# Patient Record
Sex: MALE | Race: WHITE | NOT HISPANIC OR LATINO | Employment: OTHER | URBAN - NONMETROPOLITAN AREA
[De-identification: names, ages, dates, MRNs, and addresses within clinical notes are randomized per-mention and may not be internally consistent; named-entity substitution may affect disease eponyms.]

---

## 2019-03-15 ENCOUNTER — OFFICE VISIT CONVERTED (OUTPATIENT)
Dept: FAMILY MEDICINE CLINIC | Age: 59
End: 2019-03-15
Attending: NURSE PRACTITIONER

## 2019-03-22 ENCOUNTER — HOSPITAL ENCOUNTER (OUTPATIENT)
Dept: OTHER | Facility: HOSPITAL | Age: 59
Discharge: HOME | End: 2019-03-22
Attending: NURSE PRACTITIONER

## 2019-03-22 LAB
ALBUMIN SERPL-MCNC: 4 G/DL (ref 3.5–5)
ALBUMIN/GLOB SERPL: 1.1 {RATIO} (ref 1.4–2.6)
ALP SERPL-CCNC: 88 U/L (ref 56–119)
ALT SERPL-CCNC: 22 U/L (ref 10–40)
ANION GAP SERPL CALC-SCNC: 19 MMOL/L (ref 8–19)
AST SERPL-CCNC: 19 U/L (ref 15–50)
BILIRUB SERPL-MCNC: 0.81 MG/DL (ref 0.2–1.3)
BUN SERPL-MCNC: 13 MG/DL (ref 5–25)
BUN/CREAT SERPL: 11 {RATIO} (ref 6–20)
CALCIUM SERPL-MCNC: 9.1 MG/DL (ref 8.7–10.4)
CHLORIDE SERPL-SCNC: 102 MMOL/L (ref 99–111)
CHOLEST SERPL-MCNC: 148 MG/DL (ref 107–200)
CHOLEST/HDLC SERPL: 3.7 {RATIO} (ref 3–6)
CONV CO2: 25 MMOL/L (ref 22–32)
CONV TOTAL PROTEIN: 7.8 G/DL (ref 6.3–8.2)
CREAT UR-MCNC: 1.18 MG/DL (ref 0.7–1.2)
EST. AVERAGE GLUCOSE BLD GHB EST-MCNC: 146 MG/DL
GFR SERPLBLD BASED ON 1.73 SQ M-ARVRAT: >60 ML/MIN/{1.73_M2}
GLOBULIN UR ELPH-MCNC: 3.8 G/DL (ref 2–3.5)
GLUCOSE SERPL-MCNC: 117 MG/DL (ref 70–99)
HBA1C MFR BLD: 6.7 % (ref 3.5–5.7)
HDLC SERPL-MCNC: 40 MG/DL (ref 40–60)
LDLC SERPL CALC-MCNC: 73 MG/DL (ref 70–100)
OSMOLALITY SERPL CALC.SUM OF ELEC: 295 MOSM/KG (ref 273–304)
POTASSIUM SERPL-SCNC: 4.4 MMOL/L (ref 3.5–5.3)
PSA SERPL-MCNC: 0.53 NG/ML (ref 0–4)
SODIUM SERPL-SCNC: 142 MMOL/L (ref 135–147)
TRIGL SERPL-MCNC: 174 MG/DL (ref 40–150)
VLDLC SERPL-MCNC: 35 MG/DL (ref 5–37)

## 2019-04-01 ENCOUNTER — OFFICE VISIT CONVERTED (OUTPATIENT)
Dept: FAMILY MEDICINE CLINIC | Age: 59
End: 2019-04-01
Attending: NURSE PRACTITIONER

## 2019-05-16 ENCOUNTER — HOSPITAL ENCOUNTER (OUTPATIENT)
Dept: DIABETES SERVICES | Facility: HOSPITAL | Age: 59
Setting detail: RECURRING SERIES
Discharge: HOME | End: 2019-05-17
Attending: NURSE PRACTITIONER

## 2019-08-16 ENCOUNTER — OFFICE VISIT CONVERTED (OUTPATIENT)
Dept: FAMILY MEDICINE CLINIC | Age: 59
End: 2019-08-16
Attending: NURSE PRACTITIONER

## 2019-11-08 ENCOUNTER — OFFICE VISIT (OUTPATIENT)
Dept: ORTHOPEDIC SURGERY | Facility: CLINIC | Age: 59
End: 2019-11-08

## 2019-11-08 ENCOUNTER — HOSPITAL ENCOUNTER (OUTPATIENT)
Dept: OTHER | Facility: HOSPITAL | Age: 59
Discharge: HOME | End: 2019-11-08
Attending: NURSE PRACTITIONER

## 2019-11-08 DIAGNOSIS — M25.561 RIGHT KNEE PAIN, UNSPECIFIED CHRONICITY: Primary | ICD-10-CM

## 2019-11-08 LAB
ALBUMIN SERPL-MCNC: 4.3 G/DL (ref 3.5–5)
ALBUMIN/GLOB SERPL: 1.3 {RATIO} (ref 1.4–2.6)
ALP SERPL-CCNC: 96 U/L (ref 56–119)
ALT SERPL-CCNC: 20 U/L (ref 10–40)
ANION GAP SERPL CALC-SCNC: 17 MMOL/L (ref 8–19)
AST SERPL-CCNC: 23 U/L (ref 15–50)
BILIRUB SERPL-MCNC: 0.38 MG/DL (ref 0.2–1.3)
BUN SERPL-MCNC: 14 MG/DL (ref 5–25)
BUN/CREAT SERPL: 12 {RATIO} (ref 6–20)
CALCIUM SERPL-MCNC: 9.4 MG/DL (ref 8.7–10.4)
CHLORIDE SERPL-SCNC: 103 MMOL/L (ref 99–111)
CHOLEST SERPL-MCNC: 135 MG/DL (ref 107–200)
CHOLEST/HDLC SERPL: 3.9 {RATIO} (ref 3–6)
CONV CO2: 24 MMOL/L (ref 22–32)
CONV CREATININE URINE, RANDOM: 117.1 MG/DL (ref 10–300)
CONV MICROALBUM.,U,RANDOM: <12 MG/L (ref 0–20)
CONV TOTAL PROTEIN: 7.5 G/DL (ref 6.3–8.2)
CREAT UR-MCNC: 1.19 MG/DL (ref 0.7–1.2)
EST. AVERAGE GLUCOSE BLD GHB EST-MCNC: 137 MG/DL
GFR SERPLBLD BASED ON 1.73 SQ M-ARVRAT: >60 ML/MIN/{1.73_M2}
GLOBULIN UR ELPH-MCNC: 3.2 G/DL (ref 2–3.5)
GLUCOSE SERPL-MCNC: 111 MG/DL (ref 70–99)
HBA1C MFR BLD: 6.4 % (ref 3.5–5.7)
HDLC SERPL-MCNC: 35 MG/DL (ref 40–60)
LDLC SERPL CALC-MCNC: 75 MG/DL (ref 70–100)
MICROALBUMIN/CREAT UR: 10.2 MG/G{CRE} (ref 0–25)
OSMOLALITY SERPL CALC.SUM OF ELEC: 291 MOSM/KG (ref 273–304)
POTASSIUM SERPL-SCNC: 4.3 MMOL/L (ref 3.5–5.3)
SODIUM SERPL-SCNC: 140 MMOL/L (ref 135–147)
TRIGL SERPL-MCNC: 127 MG/DL (ref 40–150)
VLDLC SERPL-MCNC: 25 MG/DL (ref 5–37)

## 2019-11-08 PROCEDURE — 99203 OFFICE O/P NEW LOW 30 MIN: CPT | Performed by: ORTHOPAEDIC SURGERY

## 2019-11-08 PROCEDURE — 73560 X-RAY EXAM OF KNEE 1 OR 2: CPT | Performed by: ORTHOPAEDIC SURGERY

## 2019-11-08 RX ORDER — PRAVASTATIN SODIUM 10 MG
10 TABLET ORAL DAILY
Refills: 1 | COMMUNITY
Start: 2019-08-16 | End: 2021-08-26 | Stop reason: SDUPTHER

## 2019-11-10 NOTE — PROGRESS NOTES
NEW VISIT    Patient: Haja Browning  ?  YOB: 1960    MRN: [id number]  ?  Chief Complaint   Patient presents with   • Right Knee - Establish Care      ?  HPI: The patient is here for a second opinion on his knee.  He has previously been seen by Dr. Gama Murphy and underwent a knee arthroscopy in 2005 for meniscus pathology.  He has done well after the knee arthroscopy but here lately his symptoms have started to recur.  He has been very active as a  in high school sports.  He denies any recent history of trauma.  He states that the pain is on the medial aspect of the knee.  There is a progressive varus deformity of the knee.  He has intermittent moderate pain which is worse with activity.  He has a constant dull ache on the medial side of the knee which radiates to the proximal tibia.  There is no sense of clicking or catching of the knee.  He does have symptoms in the retropatellar region when he goes up and down the steps.  He has been taking anti-inflammatory medication and using an elastic sleeve which does help support the need to some degree.  He states that he has recently lost 21 pounds and that seems to have helped to minimize his symptoms.  He is not looking for surgical options at this point because he can tolerate his symptoms fairly well with nonoperative means.  I have discussed with him that eventually he is going to need knee replacement surgery down the road.    Pain Location: right knee(s)  Radiation: none  Quality: dull, aching  Intensity/Severity: moderate  Duration: 2 year(s)  Onset quality: gradual   Timing: intermittent  Aggravating Factors: kneeling, rising after sitting, squatting and standing for prolonged time  Alleviating Factors: OTC analgesics, NSAID's and stretching  Previous Episodes: yes  Associated Symptoms: pain, swelling, clicking/popping, decreased strength  ADL Affected: ambulating  Previous Treatment: The patient has had a knee arthroscopy in 2005.    This  patient is a new patient.  This problem is new to this examiner.      Allergies: No Known Allergies    Medications:   Home Medications:  Current Outpatient Medications on File Prior to Visit   Medication Sig   • metFORMIN (GLUCOPHAGE) 500 MG tablet Take 500 mg by mouth 2 (Two) Times a Day.   • pravastatin (PRAVACHOL) 10 MG tablet Take 10 mg by mouth Daily.     No current facility-administered medications on file prior to visit.      Current Medications:  Scheduled Meds:  PRN Meds:.    I have reviewed the patient's medical history in detail and updated the computerized patient record.  Review and summarization of old records include:    No past medical history on file.  Past Surgical History:   Procedure Laterality Date   • KNEE SURGERY Right 01/25/2005    knee arthroscopy     Social History     Occupational History   • Not on file   Tobacco Use   • Smoking status: Not on file   Substance and Sexual Activity   • Alcohol use: Not on file   • Drug use: Not on file   • Sexual activity: Not on file      Social History     Social History Narrative   • Not on file     No family history on file.      Review of Systems  Constitutional: Negative for appetite change.   HENT: Negative.    Eyes: Negative.    Respiratory: Negative.    Cardiovascular: Negative.    Gastrointestinal: Negative.    Endocrine: Negative.    Genitourinary: Negative.    Musculoskeletal: See details of exam below.  Skin: Negative.    Allergic/Immunologic: Negative.    Hematological: Negative.    Psychiatric/Behavioral: Negative.         Wt Readings from Last 3 Encounters:   No data found for Wt     Ht Readings from Last 3 Encounters:   No data found for Ht     There is no height or weight on file to calculate BMI.  No height and weight on file for this encounter.  There were no vitals filed for this visit.      Physical Exam  Constitutional: Patient is oriented to person, place, and time. Appears well-developed and well-nourished.   HENT:   Head:  Normocephalic and atraumatic.   Eyes: Conjunctivae and EOM are normal. Pupils are equal, round, and reactive to light.   Cardiovascular: Normal rate, regular rhythm, normal heart sounds and intact distal pulses.   Pulmonary/Chest: Effort normal and breath sounds normal.   Musculoskeletal:   See detailed exam below   Neurological: Alert and oriented to person, place, and time. No sensory deficit. Coordination normal.   Skin: Skin is warm and dry. Capillary refill takes less than 2 seconds. No rash noted. No erythema.   Psychiatric: Patient has a normal mood and affect. His behavior is normal. Judgment and thought content normal.   Nursing note and vitals reviewed.      Ortho Exam:   Right knee (varus). Patient has crepitus throughout range of motion. Positive patellar grind test. Mild effusion. Lachman is negative. Pivot shift is negative. Anterior and posterior drawer signs are negative. Significant joint line tenderness is noted on the medial aspect of the knee. Patient has a varus orientation of the knee. There is fullness and tenderness in the Popliteal fossa. Mild distention of a Popliteal cyst is noted in this location. Range of motion in flexion is from 0-120 degrees. Neurovascular status is intact.  Dorsalis pedis and posterior tibial artery pulses are palpable. Common peroneal nerve function is well preserved. Patient's gait is cautious and antalgic. Skin and soft tissues are mildly swollen, consistent with synovitis and effusion. The patient has a significant limp with the first few steps after starting the gait cycle. Getting out of a chair takes a lot of effort due to pain on knee flexion.       Diagnostics:  xrays obtained today   right Knee X-Ray  Indication: Evaluation of pain and discomfort on the medial aspect of the knee.  AP, Lateral views  Findings: Moderately advanced osteoarthritis with narrowing of the medial joint space and varus alignment of the knee.  no bony lesion  Soft tissues within  normal limits  decreased joint spaces  Hardware appropriately positioned not applicable      no prior studies available for comparison.    This patient's x-ray report was graded according to the Kellgren and Daniel classification.  This took into account the joint space narrowing, osteophyte formation, sclerosis of the distal femur/proximal tibia along with deformity of those bones.  The findings were indicative of K L grade 3.    X-RAY was ordered and reviewed by Raleigh Pinon MD    Assessment:  Haja was seen today for establish care.    Diagnoses and all orders for this visit:    Right knee pain, unspecified chronicity  -     XR Knee 1 or 2 View Right  -     Diclofenac Sodium (PENNSAID) 2 % solution; Apply two pumps to affected area bid          Procedures  ?    Plan    · Compression/brace to protect the knee from buckling and giving her.  · Intra-articular injection of steroid and Visco supplementation discussed with the patient at length.  · Glucosamine, chondroitin and turmeric for cartilage health discussed with the patient.  · Calcium and vitamin D for bone health.  · At this point I do not think that he is a candidate for surgical intervention.  If his pain continues to bother him we might consider getting an MRI to make sure he does not have a meniscus tear that might be amenable to arthroscopic surgery.  The patient does not have any dominant signs of meniscus pathology on physical examination today.  · Rest, ice, compression, and elevation (RICE) therapy  · Stretching and strengthening exercises of the quads and the hamstrings.  · OTC Tylenol 500-1000mg by mouth every 6 hours as needed for pain   · Topical application of Pennsaid to act as an anti-inflammatory agent.  · Continue with the weight loss program including increasing exercise protocols, reduced portion sizes, carbohydrate limited intake, reduced calories consumed and consultation with a dietary specialist such as a registered  dietitian.  · Follow up in 1 year(s)    Date of encounter: 11/08/2019   Ralegih Pinon MD

## 2020-02-14 ENCOUNTER — OFFICE VISIT CONVERTED (OUTPATIENT)
Dept: FAMILY MEDICINE CLINIC | Age: 60
End: 2020-02-14
Attending: NURSE PRACTITIONER

## 2020-02-17 ENCOUNTER — HOSPITAL ENCOUNTER (OUTPATIENT)
Dept: OTHER | Facility: HOSPITAL | Age: 60
Discharge: HOME | End: 2020-02-17
Attending: NURSE PRACTITIONER

## 2020-02-17 LAB
ALBUMIN SERPL-MCNC: 3.9 G/DL (ref 3.5–5)
ALBUMIN/GLOB SERPL: 1.1 {RATIO} (ref 1.4–2.6)
ALP SERPL-CCNC: 74 U/L (ref 56–119)
ALT SERPL-CCNC: 17 U/L (ref 10–40)
ANION GAP SERPL CALC-SCNC: 16 MMOL/L (ref 8–19)
AST SERPL-CCNC: 20 U/L (ref 15–50)
BILIRUB SERPL-MCNC: 0.62 MG/DL (ref 0.2–1.3)
BUN SERPL-MCNC: 10 MG/DL (ref 5–25)
BUN/CREAT SERPL: 9 {RATIO} (ref 6–20)
CALCIUM SERPL-MCNC: 9.3 MG/DL (ref 8.7–10.4)
CHLORIDE SERPL-SCNC: 102 MMOL/L (ref 99–111)
CHOLEST SERPL-MCNC: 136 MG/DL (ref 107–200)
CHOLEST/HDLC SERPL: 3.7 {RATIO} (ref 3–6)
CONV CO2: 25 MMOL/L (ref 22–32)
CONV TOTAL PROTEIN: 7.3 G/DL (ref 6.3–8.2)
CREAT UR-MCNC: 1.07 MG/DL (ref 0.7–1.2)
EST. AVERAGE GLUCOSE BLD GHB EST-MCNC: 148 MG/DL
GFR SERPLBLD BASED ON 1.73 SQ M-ARVRAT: >60 ML/MIN/{1.73_M2}
GLOBULIN UR ELPH-MCNC: 3.4 G/DL (ref 2–3.5)
GLUCOSE SERPL-MCNC: 110 MG/DL (ref 70–99)
HBA1C MFR BLD: 6.8 % (ref 3.5–5.7)
HDLC SERPL-MCNC: 37 MG/DL (ref 40–60)
LDLC SERPL CALC-MCNC: 75 MG/DL (ref 70–100)
OSMOLALITY SERPL CALC.SUM OF ELEC: 288 MOSM/KG (ref 273–304)
POTASSIUM SERPL-SCNC: 4.2 MMOL/L (ref 3.5–5.3)
SODIUM SERPL-SCNC: 139 MMOL/L (ref 135–147)
TRIGL SERPL-MCNC: 122 MG/DL (ref 40–150)
VLDLC SERPL-MCNC: 24 MG/DL (ref 5–37)

## 2020-07-28 DIAGNOSIS — M25.561 RIGHT KNEE PAIN, UNSPECIFIED CHRONICITY: ICD-10-CM

## 2020-08-19 ENCOUNTER — OFFICE VISIT CONVERTED (OUTPATIENT)
Dept: FAMILY MEDICINE CLINIC | Age: 60
End: 2020-08-19
Attending: NURSE PRACTITIONER

## 2020-08-24 ENCOUNTER — HOSPITAL ENCOUNTER (OUTPATIENT)
Dept: OTHER | Facility: HOSPITAL | Age: 60
Discharge: HOME | End: 2020-08-24
Attending: NURSE PRACTITIONER

## 2020-08-24 LAB
ALBUMIN SERPL-MCNC: 4.4 G/DL (ref 3.5–5)
ALBUMIN/GLOB SERPL: 1.5 {RATIO} (ref 1.4–2.6)
ALP SERPL-CCNC: 78 U/L (ref 56–119)
ALT SERPL-CCNC: 20 U/L (ref 10–40)
ANION GAP SERPL CALC-SCNC: 17 MMOL/L (ref 8–19)
AST SERPL-CCNC: 23 U/L (ref 15–50)
BASOPHILS # BLD MANUAL: 0.05 10*3/UL (ref 0–0.2)
BASOPHILS NFR BLD MANUAL: 0.6 % (ref 0–3)
BILIRUB SERPL-MCNC: 0.69 MG/DL (ref 0.2–1.3)
BUN SERPL-MCNC: 15 MG/DL (ref 5–25)
BUN/CREAT SERPL: 12 {RATIO} (ref 6–20)
CALCIUM SERPL-MCNC: 10 MG/DL (ref 8.7–10.4)
CHLORIDE SERPL-SCNC: 103 MMOL/L (ref 99–111)
CHOLEST SERPL-MCNC: 153 MG/DL (ref 107–200)
CHOLEST/HDLC SERPL: 4.4 {RATIO} (ref 3–6)
CONV CO2: 21 MMOL/L (ref 22–32)
CONV TOTAL PROTEIN: 7.4 G/DL (ref 6.3–8.2)
CREAT UR-MCNC: 1.3 MG/DL (ref 0.7–1.2)
DEPRECATED RDW RBC AUTO: 42.2 FL
EOSINOPHIL # BLD MANUAL: 0.37 10*3/UL (ref 0–0.7)
EOSINOPHIL NFR BLD MANUAL: 4.2 % (ref 0–7)
ERYTHROCYTE [DISTWIDTH] IN BLOOD BY AUTOMATED COUNT: 12.8 % (ref 11.5–14.5)
EST. AVERAGE GLUCOSE BLD GHB EST-MCNC: 137 MG/DL
GFR SERPLBLD BASED ON 1.73 SQ M-ARVRAT: 60 ML/MIN/{1.73_M2}
GLOBULIN UR ELPH-MCNC: 3 G/DL (ref 2–3.5)
GLUCOSE SERPL-MCNC: 114 MG/DL (ref 70–99)
GRANS (ABSOLUTE): 6.2 10*3/UL (ref 2–8)
GRANS: 70.1 % (ref 30–85)
HBA1C MFR BLD: 15.7 G/DL (ref 14–18)
HBA1C MFR BLD: 6.4 % (ref 3.5–5.7)
HCT VFR BLD AUTO: 46.4 % (ref 42–52)
HDLC SERPL-MCNC: 35 MG/DL (ref 40–60)
IMM GRANULOCYTES # BLD: 0.05 10*3/UL (ref 0–0.54)
IMM GRANULOCYTES NFR BLD: 0.6 % (ref 0–0.43)
LDLC SERPL CALC-MCNC: 85 MG/DL (ref 70–100)
LYMPHOCYTES # BLD MANUAL: 1.59 10*3/UL (ref 1–5)
LYMPHOCYTES NFR BLD MANUAL: 6.5 % (ref 3–10)
MCH RBC QN AUTO: 30.4 PG (ref 27–31)
MCHC RBC AUTO-ENTMCNC: 33.8 G/DL (ref 33–37)
MCV RBC AUTO: 89.9 FL (ref 80–96)
MONOCYTES # BLD AUTO: 0.57 10*3/UL (ref 0.2–1.2)
OSMOLALITY SERPL CALC.SUM OF ELEC: 286 MOSM/KG (ref 273–304)
PLATELET # BLD AUTO: 218 10*3/UL (ref 130–400)
PMV BLD AUTO: 10.1 FL (ref 7.4–10.4)
POTASSIUM SERPL-SCNC: 4.2 MMOL/L (ref 3.5–5.3)
PSA SERPL-MCNC: 0.61 NG/ML (ref 0–4)
RBC # BLD AUTO: 5.16 10*6/UL (ref 4.7–6.1)
SODIUM SERPL-SCNC: 137 MMOL/L (ref 135–147)
TRIGL SERPL-MCNC: 167 MG/DL (ref 40–150)
TSH SERPL-ACNC: 1.66 M[IU]/L (ref 0.27–4.2)
VARIANT LYMPHS NFR BLD MANUAL: 18 % (ref 20–45)
VLDLC SERPL-MCNC: 33 MG/DL (ref 5–37)
WBC # BLD AUTO: 8.83 10*3/UL (ref 4.8–10.8)

## 2020-10-16 DIAGNOSIS — M25.561 RIGHT KNEE PAIN, UNSPECIFIED CHRONICITY: Primary | ICD-10-CM

## 2020-10-19 ENCOUNTER — HOSPITAL ENCOUNTER (OUTPATIENT)
Dept: OTHER | Facility: HOSPITAL | Age: 60
Discharge: HOME | End: 2020-10-19
Attending: PHYSICIAN ASSISTANT

## 2020-10-19 ENCOUNTER — OFFICE VISIT (OUTPATIENT)
Dept: ORTHOPEDIC SURGERY | Facility: CLINIC | Age: 60
End: 2020-10-19

## 2020-10-19 VITALS — HEIGHT: 73 IN | WEIGHT: 250 LBS | BODY MASS INDEX: 33.13 KG/M2 | TEMPERATURE: 97.9 F

## 2020-10-19 DIAGNOSIS — M17.11 PRIMARY OSTEOARTHRITIS OF RIGHT KNEE: Primary | ICD-10-CM

## 2020-10-19 PROCEDURE — 99213 OFFICE O/P EST LOW 20 MIN: CPT | Performed by: PHYSICIAN ASSISTANT

## 2020-10-19 RX ORDER — MELOXICAM 7.5 MG/1
TABLET ORAL
Qty: 30 TABLET | Refills: 3 | Status: SHIPPED | OUTPATIENT
Start: 2020-10-19 | End: 2021-02-16 | Stop reason: SDUPTHER

## 2020-10-19 NOTE — PROGRESS NOTES
FOLLOW UP VISIT    Patient: Nolan Browning  ?  YOB: 1960    MRN: [id number]  ?  Chief Complaint   Patient presents with   • Right Knee - Pain      ?  HPI:   Nolan Browning is a 60 y.o. male who presents with follow up on right knee pain.  He was last seen in November 2019 and states that over the last several months the knee has increased.  He recently retired for a second time, last week, and can already tell a big difference in his pain level.  He reports increased pain with increased standing and walking.    Pain Location:  RIGHT knee  Radiation: into the posterior aspect of the knee  Quality: aching  Intensity/Severity: moderate  Duration: 5 years  Progression of symptoms: yes, progressive worsening although right now symptoms are improved  Onset quality: gradual   Timing: intermittent  Aggravating Factors: walking, squatting, standing  Alleviating Factors: NSAIDs, rest  Previous Episodes: yes  Associated Symptoms: pain, swelling, clicking/popping  ADLs Affected: work related activities, recreational activities/sports, ambulating  Previous Treatment: NSAIDs      This patient is an established patient.  This problem is new to this examiner.      Allergies: No Known Allergies    Medications:   Home Medications:  Current Outpatient Medications on File Prior to Visit   Medication Sig   • Diclofenac Sodium (Pennsaid) 2 % solution Apply two pumps to the affected area twice a day prn pain and discomfort   • metFORMIN (GLUCOPHAGE) 500 MG tablet Take 500 mg by mouth 2 (Two) Times a Day.   • pravastatin (PRAVACHOL) 10 MG tablet Take 10 mg by mouth Daily.     No current facility-administered medications on file prior to visit.      Current Medications:  Scheduled Meds:  PRN Meds:.    I have reviewed the patient's medical history in detail and updated the computerized patient record.  Review and summarization of old records include:    Past Medical History:   Diagnosis Date   • Diabetes (CMS/[facility])    •  "High cholesterol      Past Surgical History:   Procedure Laterality Date   • KNEE SURGERY Right 01/25/2005    knee arthroscopy    • WISDOM TOOTH EXTRACTION       Social History     Occupational History   • Not on file   Tobacco Use   • Smoking status: Never Smoker   • Smokeless tobacco: Never Used   Substance and Sexual Activity   • Alcohol use: Yes   • Drug use: Never   • Sexual activity: Defer     Family History   Problem Relation Age of Onset   • Heart disease Mother    • Dementia Father    • Stroke Father          Review of Systems  Constitutional: Negative.  Negative for fever.   Eyes: Negative.    Respiratory: Negative.    Cardiovascular: Negative.    Endocrine: Negative.    Musculoskeletal: Positive for arthralgias, gait problem and joint swelling.   Skin: Negative.  Negative for rash and wound.   Allergic/Immunologic: Negative.    Neurological: Negative for numbness.   Hematological: Negative.    Psychiatric/Behavioral: Negative.         Wt Readings from Last 3 Encounters:   10/19/20 113 kg (250 lb)     Ht Readings from Last 3 Encounters:   10/19/20 185.4 cm (73\")     Body mass index is 32.98 kg/m².  Facility age limit for growth percentiles is 20 years.  Vitals:    10/19/20 1123   Temp: 97.9 °F (36.6 °C)         Physical Exam  Constitutional: Patient is oriented to person, place, and time. Appears well-developed and well-nourished.   HENT:   Head: Normocephalic and atraumatic.   Eyes: Conjunctivae and EOM are normal. Pupils are equal, round, and reactive to light.   Cardiovascular: Normal rate and intact distal pulses.   Pulmonary/Chest: Effort normal.   Musculoskeletal:   See detailed exam below   Neurological: Alert and oriented to person, place, and time. No sensory deficit. Coordination normal.   Skin: Skin is warm and dry. Capillary refill takes less than 2 seconds. No rash noted. No erythema.   Psychiatric: Patient has a normal mood and affect. His behavior is normal. Judgment and thought " content normal.   Nursing note and vitals reviewed.      Ortho Exam:   RIGHT knee:  There is mild joint line tenderness at the medial and posterior aspect of the knee. The knee has a slight varus orientation.   Positive for crepitus throughout range of motion.   Negative for effusion.  Positive patellar grind test.   Negative Lachman test.    Negative anterior and posterior drawer.  Range of motion in extension and flexion is: 0-90 degrees.  Neurovascular status is intact.  Dorsalis pedis and posterior tibial artery pulses are palpable. Common peroneal nerve function is well preserved.   Gait is cautious and antalgic.        Diagnostics:  Right knee xrays 2 views were ordered by Andres Marie PA-C and performed at [facility] on 10/19/20. These images were independently viewed and interpreted by myself, my impression as follows:  Findings: Moderate tricompartmental osteoarthritis with near bone-on-bone at the medial joint space.  Bony lesion: no  Soft tissues: within normal limits  Joint spaces: decreased  Hardware appropriately positioned: not applicable  Prior studies available for comparison: yes                Assessment:  Diagnoses and all orders for this visit:    1. Primary osteoarthritis of right knee (Primary)  -     meloxicam (MOBIC) 7.5 MG tablet; 1 Oral Daily with food.  Dispense: 30 tablet; Refill: 3            Plan       · Management of a stable chronic illness/condition   · Discussion of orthopaedic goals and activities.  · Risk, benefits, and merits of treatment alternatives reviewed with the patient.  Discussed treatment options to include oral anti-inflammatories, intra-articular steroid injections or viscosupplementation, and eventual total knee replacement.  At this point since his pain has improved since making the appointment we are going to try oral Mobic once daily.  If his pain were to increase and he wants to try an injection he will call our office.  · Ice,  heat, and/or modalities as beneficial  · Patient is encouraged to call or return for any issues or concerns.  · Continue with knee sleeve  · Follow up in 6 months      Andres Marie PA-C  Date of encounter: 10/19/2020     Electronically signed by Andres Marie PA-C, 10/19/20, 11:25 AM EDT.    EMR Dragon/Transcription disclaimer:  Much of this encounter note is an electronic transcription/translation of spoken language to printed text. The electronic translation of spoken language may permit erroneous, or at times, nonsensical words or phrases to be inadvertently transcribed; Although I have reviewed the note for such errors, some may still exist.

## 2020-10-21 DIAGNOSIS — S89.91XA KNEE INJURY, RIGHT, INITIAL ENCOUNTER: Primary | ICD-10-CM

## 2020-10-22 ENCOUNTER — HOSPITAL ENCOUNTER (OUTPATIENT)
Dept: OTHER | Facility: HOSPITAL | Age: 60
Discharge: HOME | End: 2020-10-22
Attending: ORTHOPAEDIC SURGERY

## 2020-10-22 ENCOUNTER — OFFICE VISIT (OUTPATIENT)
Dept: ORTHOPEDIC SURGERY | Facility: CLINIC | Age: 60
End: 2020-10-22

## 2020-10-22 VITALS — TEMPERATURE: 97.4 F | WEIGHT: 255 LBS | BODY MASS INDEX: 33.8 KG/M2 | HEIGHT: 73 IN

## 2020-10-22 DIAGNOSIS — M25.461 EFFUSION OF RIGHT KNEE: ICD-10-CM

## 2020-10-22 DIAGNOSIS — M17.11 PRIMARY OSTEOARTHRITIS OF RIGHT KNEE: Primary | ICD-10-CM

## 2020-10-22 PROCEDURE — 99213 OFFICE O/P EST LOW 20 MIN: CPT | Performed by: ORTHOPAEDIC SURGERY

## 2020-10-22 PROCEDURE — 20610 DRAIN/INJ JOINT/BURSA W/O US: CPT | Performed by: ORTHOPAEDIC SURGERY

## 2020-10-22 RX ADMIN — METHYLPREDNISOLONE ACETATE 160 MG: 80 INJECTION, SUSPENSION INTRA-ARTICULAR; INTRALESIONAL; INTRAMUSCULAR; SOFT TISSUE at 15:38

## 2020-10-22 RX ADMIN — LIDOCAINE HYDROCHLORIDE 2 ML: 10 INJECTION, SOLUTION EPIDURAL; INFILTRATION; INTRACAUDAL; PERINEURAL at 15:38

## 2020-10-22 NOTE — PROGRESS NOTES
Orthopedic office visit.    Patient: Nolan Browning    YOB: 1960    MRN: [id number]    Chief Complaints: right knee injury while playing golf on 10/21/2020    History of Present Illness: Patient returns today for right knee pain.  His pain is located over the medial and lateral aspect of the joint.  The pain has been progressive in nature and remains intermittent .  His pain is worsened by going up and down stairs, kneeling, rising after sitting. There has been improvement in the past with injections.  He states that he fell on the golf links and since that time has been limping quite significantly.  I am concerned that he might have a meniscus tear or a ligament injury.  We have also discussed the possibility of a nondisplaced fracture of the metaphysis of the bone.  He states that he has been limping quite significantly since the time of his injury.  He has had previous injuries to the knee especially on the right side several years ago.    This problem is not new to this examiner.     Allergies: No Known Allergies    Medications:   Home Medications:  Current Outpatient Medications on File Prior to Visit   Medication Sig   • Diclofenac Sodium (Pennsaid) 2 % solution Apply two pumps to the affected area twice a day prn pain and discomfort   • meloxicam (MOBIC) 7.5 MG tablet 1 Oral Daily with food.   • metFORMIN (GLUCOPHAGE) 500 MG tablet Take 500 mg by mouth 2 (Two) Times a Day.   • pravastatin (PRAVACHOL) 10 MG tablet Take 10 mg by mouth Daily.     No current facility-administered medications on file prior to visit.      Current Medications:  Scheduled Meds:  PRN Meds:.    I have reviewed the patient's medical history in detail and updated the computerized patient record.  Review and summarization of old records include:    Past Medical History:   Diagnosis Date   • Diabetes (CMS/[facility])    • High cholesterol      Past Surgical History:   Procedure Laterality Date   • KNEE SURGERY Right 01/25/2005  "   knee arthroscopy    • WISDOM TOOTH EXTRACTION       Social History     Occupational History   • Not on file   Tobacco Use   • Smoking status: Never Smoker   • Smokeless tobacco: Never Used   Substance and Sexual Activity   • Alcohol use: Yes   • Drug use: Never   • Sexual activity: Defer      Social History     Social History Narrative   • Not on file     Family History   Problem Relation Age of Onset   • Heart disease Mother    • Dementia Father    • Stroke Father        Review of Systems  Constitutional: Negative for appetite change.   HENT: Negative.    Eyes: Negative.    Respiratory: Negative.    Cardiovascular: Negative.    Gastrointestinal: Negative.    Endocrine: Negative.    Genitourinary: Negative.    Musculoskeletal: See details of exam below.  Skin: Negative.    Allergic/Immunologic: Negative.    Hematological: Negative.    Psychiatric/Behavioral: Negative.         Wt Readings from Last 3 Encounters:   10/22/20 116 kg (255 lb)   10/19/20 113 kg (250 lb)     Ht Readings from Last 3 Encounters:   10/22/20 185.4 cm (73\")   10/19/20 185.4 cm (73\")     Body mass index is 33.64 kg/m².  Facility age limit for growth percentiles is 20 years.  Vitals:    10/22/20 1518   Temp: 97.4 °F (36.3 °C)       Physical Exam  Constitutional: Patient is oriented to person, place, and time. Appears well-developed and well-nourished.   HENT:   Head: Normocephalic and atraumatic.   Eyes: Conjunctivae and EOM are normal. Pupils are equal, round, and reactive to light.   Cardiovascular: Normal rate, regular rhythm, normal heart sounds and intact distal pulses.   Pulmonary/Chest: Effort normal and breath sounds normal.   Musculoskeletal:   See detailed exam below   Neurological: Alert and oriented to person, place, and time. No sensory deficit. Coordination normal.   Skin: Skin is warm and dry. Capillary refill takes less than 2 seconds. No rash noted. No erythema.   Psychiatric: Patient has a normal mood and affect. His " behavior is normal. Judgment and thought content normal.   Nursing note and vitals reviewed.    Musculoskeletal:    Right knee (varus). Patient has crepitus throughout range of motion. Positive patellar grind test. Mild effusion. Lachman is negative. Pivot shift is negative. Anterior and posterior drawer signs are negative. Significant joint line tenderness is noted on the medial aspect of the knee. Patient has a varus orientation of the knee. There is fullness and tenderness in the Popliteal fossa. Mild distention of a Popliteal cyst is noted in this location. Range of motion in flexion is from 0-120 degrees. Neurovascular status is intact.  Dorsalis pedis and posterior tibial artery pulses are palpable. Common peroneal nerve function is well preserved. Patient's gait is cautious and antalgic. Skin and soft tissues are mildly swollen, consistent with synovitis and effusion. The patient has a significant limp with the first few steps after starting the gait cycle. Getting out of a chair takes a lot of effort due to pain on knee flexion.       Diagnostics:      Procedure:  Large Joint Arthrocentesis: R knee  Date/Time: 10/22/2020 3:38 PM  Consent given by: patient  Site marked: site marked  Timeout: Immediately prior to procedure a time out was called to verify the correct patient, procedure, equipment, support staff and site/side marked as required   Supporting Documentation  Indications: pain   Procedure Details  Location: knee - R knee  Preparation: Patient was prepped and draped in the usual sterile fashion  Needle size: 25 G  Approach: anteromedial  Medications administered: 2 mL lidocaine PF 1% 1 %; 160 mg methylPREDNISolone acetate 80 MG/ML  Aspirate amount: 20 mL  Aspirate: bloody  Patient tolerance: patient tolerated the procedure well with no immediate complications          Assessment:   Diagnoses and all orders for this visit:    1. Primary osteoarthritis of right knee (Primary)          Plan:    · Injected patient's right knee joint(s)with steroid from an anteromedial approach.  · 20 cc of altered blood were withdrawn from the joint as a part of the arthrocentesis procedure.  · Compression/brace to the knee to prevent buckling and giving out.  · Discussed with the patient about getting an MRI for continued symptoms.  · Rest, ice, compression, and elevation (RICE) therapy.  · Calcium and vitamin D for bone health.  · Discussed with the patient that if the MRI shows a tear of his meniscus or cartilage or ligament he might be a candidate for an surgical intervention.  If he does not demonstrate any signs of an injury sustained during this outing at golf then we could treat him nonoperatively.  · OTC Alternate Ibuprofen and Tylenol as needed  · Follow up in 6 week(s)    Date of encounter: 10/22/2020   Raleigh Pinon MD

## 2020-10-25 PROBLEM — M25.461 EFFUSION OF RIGHT KNEE: Status: ACTIVE | Noted: 2020-10-25

## 2020-10-25 RX ORDER — METHYLPREDNISOLONE ACETATE 80 MG/ML
160 INJECTION, SUSPENSION INTRA-ARTICULAR; INTRALESIONAL; INTRAMUSCULAR; SOFT TISSUE
Status: COMPLETED | OUTPATIENT
Start: 2020-10-22 | End: 2020-10-22

## 2020-10-25 RX ORDER — LIDOCAINE HYDROCHLORIDE 10 MG/ML
2 INJECTION, SOLUTION EPIDURAL; INFILTRATION; INTRACAUDAL; PERINEURAL
Status: COMPLETED | OUTPATIENT
Start: 2020-10-22 | End: 2020-10-22

## 2021-02-16 DIAGNOSIS — M17.11 PRIMARY OSTEOARTHRITIS OF RIGHT KNEE: ICD-10-CM

## 2021-02-16 RX ORDER — MELOXICAM 7.5 MG/1
TABLET ORAL
Qty: 30 TABLET | Refills: 3 | Status: SHIPPED | OUTPATIENT
Start: 2021-02-16 | End: 2021-06-28 | Stop reason: SDUPTHER

## 2021-02-19 ENCOUNTER — OFFICE VISIT CONVERTED (OUTPATIENT)
Dept: FAMILY MEDICINE CLINIC | Age: 61
End: 2021-02-19
Attending: NURSE PRACTITIONER

## 2021-02-22 ENCOUNTER — HOSPITAL ENCOUNTER (OUTPATIENT)
Dept: OTHER | Facility: HOSPITAL | Age: 61
Discharge: HOME | End: 2021-02-22
Attending: NURSE PRACTITIONER

## 2021-02-22 LAB
ALBUMIN SERPL-MCNC: 4.2 G/DL (ref 3.5–5)
ALBUMIN/GLOB SERPL: 1.4 {RATIO} (ref 1.4–2.6)
ALP SERPL-CCNC: 87 U/L (ref 56–119)
ALT SERPL-CCNC: 21 U/L (ref 10–40)
ANION GAP SERPL CALC-SCNC: 14 MMOL/L (ref 8–19)
AST SERPL-CCNC: 19 U/L (ref 15–50)
BILIRUB SERPL-MCNC: 0.95 MG/DL (ref 0.2–1.3)
BUN SERPL-MCNC: 10 MG/DL (ref 5–25)
BUN/CREAT SERPL: 9 {RATIO} (ref 6–20)
CALCIUM SERPL-MCNC: 9.1 MG/DL (ref 8.7–10.4)
CHLORIDE SERPL-SCNC: 105 MMOL/L (ref 99–111)
CHOLEST SERPL-MCNC: 142 MG/DL (ref 107–200)
CHOLEST/HDLC SERPL: 3.6 {RATIO} (ref 3–6)
CONV CO2: 27 MMOL/L (ref 22–32)
CONV CREATININE URINE, RANDOM: 121.2 MG/DL (ref 10–300)
CONV MICROALBUM.,U,RANDOM: <12 MG/L (ref 0–20)
CONV TOTAL PROTEIN: 7.3 G/DL (ref 6.3–8.2)
CREAT UR-MCNC: 1.14 MG/DL (ref 0.7–1.2)
EST. AVERAGE GLUCOSE BLD GHB EST-MCNC: 140 MG/DL
GFR SERPLBLD BASED ON 1.73 SQ M-ARVRAT: >60 ML/MIN/{1.73_M2}
GLOBULIN UR ELPH-MCNC: 3.1 G/DL (ref 2–3.5)
GLUCOSE SERPL-MCNC: 109 MG/DL (ref 70–99)
HBA1C MFR BLD: 6.5 % (ref 3.5–5.7)
HDLC SERPL-MCNC: 39 MG/DL (ref 40–60)
LDLC SERPL CALC-MCNC: 75 MG/DL (ref 70–100)
MICROALBUMIN/CREAT UR: 9.9 MG/G{CRE} (ref 0–25)
OSMOLALITY SERPL CALC.SUM OF ELEC: 294 MOSM/KG (ref 273–304)
POTASSIUM SERPL-SCNC: 4.4 MMOL/L (ref 3.5–5.3)
SODIUM SERPL-SCNC: 142 MMOL/L (ref 135–147)
TRIGL SERPL-MCNC: 141 MG/DL (ref 40–150)
VLDLC SERPL-MCNC: 28 MG/DL (ref 5–37)

## 2021-04-28 DIAGNOSIS — M17.11 PRIMARY OSTEOARTHRITIS OF RIGHT KNEE: Primary | ICD-10-CM

## 2021-04-29 ENCOUNTER — OFFICE VISIT (OUTPATIENT)
Dept: ORTHOPEDIC SURGERY | Facility: CLINIC | Age: 61
End: 2021-04-29

## 2021-04-29 VITALS — HEIGHT: 73 IN | BODY MASS INDEX: 33.8 KG/M2 | TEMPERATURE: 96.6 F | WEIGHT: 255 LBS

## 2021-04-29 DIAGNOSIS — M17.11 PRIMARY OSTEOARTHRITIS OF RIGHT KNEE: Primary | ICD-10-CM

## 2021-04-29 DIAGNOSIS — M25.461 EFFUSION OF RIGHT KNEE: ICD-10-CM

## 2021-04-29 PROCEDURE — 99213 OFFICE O/P EST LOW 20 MIN: CPT | Performed by: ORTHOPAEDIC SURGERY

## 2021-05-18 NOTE — PROGRESS NOTES
Nolan Browning  1960     Office/Outpatient Visit    Visit Date: Fri, Feb 14, 2020 08:34 am    Provider: Greta Lawrence N.P. (Assistant: Kathy Ramirez MA)    Location: [facility]        Electronically signed by Greta Lawrence N.P. on  02/14/2020 09:54:08 AM                             Subjective:        CC: Haja is a 59 year old White male.  Patient presents today for six month follow up (not taking multivitamin)         HPI: Patient to be evaluated for type 2 diabetes.  Current meds include an oral hypoglycemic ( Glucophage ) and a statin (Pravastatin).  He reports home blood glucose readings have been excellent, with average fasting glucoses running <120 mg/dL.  Most recent lab results include Hemoglobin A1c:  6.4 (%) (11/2019).  In regard to preventative care, Has not yet had diabetic eye exam..  Met with dietician but did not find this helpful and therefore did not meet with diabetes educator.    ROS:     CONSTITUTIONAL:  Negative for chills and fever.      EYES:  Negative for blurred vision and eye drainage.      E/N/T:  Negative for ear pain and sore throat.      CARDIOVASCULAR:  Negative for chest pain and palpitations.      RESPIRATORY:  Negative for dyspnea and frequent wheezing.      NEUROLOGICAL:  Negative for dizziness and headaches.      PSYCHIATRIC:  Negative for depression and suicidal thoughts.          Past Medical History / Family History / Social History:         Last Reviewed on 2/14/2020 09:52 AM by Greta Lawrence    Past Medical History:             PAST MEDICAL HISTORY         Gout         CURRENT MEDICAL PROVIDERS:    Orthopedist: Dr Pinon         PREVENTIVE HEALTH MAINTENANCE             COLORECTAL CANCER SCREENING: Up to date (colonoscopy q10y; sigmoidoscopy q5y; Cologuard q3y) was last done 7/17/19, Results are in chart; colonoscopy with the following abnormalities noted-- Polyp(s) and Diverticulosis; The next colonoscopy is due  2022     PSA: was last done 3/2/19  with normal results         Surgical History:         right knee for meniscus tear 2005;         Family History:         Mother: Cause of death was MI     Brother(s): Diabetes         Social History:     Occupation: Retired (Prior occupation: teacher, )     Marital Status:      Children: 1 child         Tobacco/Alcohol/Supplements:     Last Reviewed on 2/14/2020 08:39 AM by Kathy Ramirez    Tobacco: He has never smoked.          Alcohol: Frequency: Socially When he drinks, the average quantity of alcohol is 1 drinks.   He typically consumes beer.          Substance Abuse History:     None         Mental Health History:     NEGATIVE         Communicable Diseases (eg STDs):     Reportable health conditions; NEGATIVE         Current Problems:     Gout, unspecified    Encounter for screening for other disorder    Type 2 diabetes mellitus without complications    Diverticulosis of large intestine without perforation or abscess without bleeding    Personal history of colonic polyps    Encounter for immunization    Encounter for screening for depression        Immunizations:     None        Allergies:     Last Reviewed on 2/14/2020 08:38 AM by Kathy Ramirez    No Known Allergies.        Current Medications:     Last Reviewed on 2/14/2020 08:39 AM by Kathy Ramirez    Osteo Bi-Flex po qd     Tumeric po qd     Metformin HCl 500mg Tablet [1 tab bid]        Objective:        Vitals:         Historical:     8/16/2019  BP:   113/84 mm Hg ( (left arm, , sitting, );) 8/16/2019  P:   90bpm ( (left arm (BP Cuff), , sitting, );) 8/16/2019  Wt:   256.8lbs    Current: 2/14/2020 8:40:02 AM    Ht:  6 ft, 1 in;  Wt: 260.6 lbs;  BMI: 34.4T: 98.5 F (oral);  BP: 117/75 mm Hg (left arm, sitting);  P: 96 bpm (left arm (BP Cuff), sitting);  sCr: 1.19 mg/dL;  GFR: 79.07        Exams:     PHYSICAL EXAM:     GENERAL: well developed, well nourished;  no apparent distress;     RESPIRATORY: normal respiratory rate and pattern with no  distress; normal breath sounds with no rales, rhonchi, wheezes or rubs;     CARDIOVASCULAR: normal rate; rhythm is regular;     NEUROLOGIC: mental status: alert and oriented x 3; GROSSLY INTACT     PSYCHIATRIC: appropriate affect and demeanor; normal thought and perception;     Left foot exam    Protective sensation using Monofilament test: NORMAL sensation. Patient detects .07 grams of force which is considered normal.    Vascular status: normal peripheral vascular exam with palpable dorsal pedal and posterior tibal pulses and brisk digital capillary refill    Skin is intact without sores or ulcers    Right foot exam    Protective sensation using Monofilament test: NORMAL sensation. Patient detects .07 grams of force which is considered normal.    Vascular status: normal peripheral vascular exam with palpable dorsal pedal and posterior tibal pulses and brisk digital capillary refill    Skin is intact without sores or ulcers         Assessment:         E11.9   Type 2 diabetes mellitus without complications       Z23   Encounter for immunization       Z13.31   Encounter for screening for depression           ORDERS:         Meds Prescribed:       [Refilled] metFORMIN 500 mg oral tablet [1 tab bid], #180 (one hundred and eighty) tablets, Refills: 1 (one)       [New Rx] pravastatin 10 mg oral tablet [take 1 tablet (10 mg) by oral route once daily], #90 (ninety) tablets, Refills: 1 (one)       [New Rx] lisinopriL 5 mg oral tablet [take 1 tablet (5 mg) by oral route once daily], #90 (ninety) tablets, Refills: 1 (one)         Radiology/Test Orders:       3017F  Colorectal CA screen results documented and reviewed (PV)  (In-House)              Lab Orders:       10786  DIAB - [facility] LIPID,CMP, A1C: 01365, 99818, 86887  (Send-Out)              Procedures Ordered:       92040  Immunization administration; one vaccine  (In-House)              Other Orders:       41669  Influenza virus vaccine, quadrivalent, split virus,  preservative free 3 years of age & older  (In-House)            DLEYE  Dilated Eye Exam  (Send-Out)              Depression screen negative  (In-House)            2028F  Foot examination performed (includes examination through visual inspection, sensory exam with monofilament, and pulse exam - report when any of the three components are completed) (DM)4  (In-House)                      Plan:         Type 2 diabetes mellitus without complicationsWill add Ace inhibitor for renal protection. Due for eye exam. Plan to schedule.        RECOMMENDATIONS: instructed in use of glucometer ( check glucose daily at random intervals ), adherance to Low carb, NCS diet diet,  HgbA1C every 6 months, urine microalbumin test yearly, annual monofilament test for evaluating sensation in feet, daily foot self-inspection, yearly dental exams, annual eye exams, and need for yearly flu shots.      FOLLOW-UP: Schedule a follow-up visit in 6 months.  LABORATORY:  Labs ordered to be performed today include Diabetes Panel 1; CMP, Lipid, A1C.  MIPS Vaccines Flu and Pneumonia updated in Shot record Colorectal Cancer Screening is up to date and the results are in the chart           Prescriptions:       [Refilled] metFORMIN 500 mg oral tablet [1 tab bid], #180 (one hundred and eighty) tablets, Refills: 1 (one)       [New Rx] pravastatin 10 mg oral tablet [take 1 tablet (10 mg) by oral route once daily], #90 (ninety) tablets, Refills: 1 (one)       [New Rx] lisinopriL 5 mg oral tablet [take 1 tablet (5 mg) by oral route once daily], #90 (ninety) tablets, Refills: 1 (one)           Orders:       DLEYE  Dilated Eye Exam  (Send-Out)            [address]  [address] LIPID,CMP, A1C: 07440, 92120, 51603  (Send-Out)            3017F  Colorectal CA screen results documented and reviewed (PV)  (In-House)              Encounter for immunization          Immunizations:       46662  Immunization administration; one vaccine  (In-House)            91514   "Influenza virus vaccine, quadrivalent, split virus, preservative free 3 years of age & older  (In-House)                Dose (ml): 0.5  Site: left deltoid  Route: intramuscular  Administered by: Kathy Ramirez          : [employer]  Lot #: [id number]  Exp: 06/30/2020          NDC: [address]-77        Encounter for screening for depression    MIPS PHQ-9 Depression Screening: Completed form scanned and in chart; Total Score 0; Negative Depression Screen           Orders:         Depression screen negative  (In-House)                  Other Orders      2028F  Foot examination performed (includes examination through visual inspection, sensory exam with monofilament, and pulse exam - report when any of the three components are completed) (DM)4  (In-House)              Patient Recommendations:        For  Type 2 diabetes mellitus without complications:    Obtain instruction in the proper use of a home blood glucose monitor. Follow a diabetic diet as directed. Have blood checked regularly for long term glucose control (a test called \"hemoglobin A1C\"). In your case, the hemoglobin A1C should be checked at least every 6 months. Have your urine regularly tested to check for protein, which is an early sign of diabetic kidney problems. Get this urine protein test done every year. Have an annual monofilament test to check for diabetes-related sensory nerve disturbance in the feet. Examine your feet daily.  Small cuts and injuries often go unnoticed and can lead to serious infections. Have an annual dental exam. Have an annual eye exam. Obtain a flu shot each year.  Schedule a follow-up visit in 6 months.              Charge Capture:         Primary Diagnosis:     E11.9  Type 2 diabetes mellitus without complications           Orders:      26048  Office/outpatient visit; established patient, level 4  (In-House)            3017F  Colorectal CA screen results documented and reviewed (PV)  (In-House)              " Z23  Encounter for immunization           Orders:      59040  Immunization administration; one vaccine  (In-House)            46228  Influenza virus vaccine, quadrivalent, split virus, preservative free 3 years of age & older  (In-House)              Z13.31  Encounter for screening for depression           Orders:        Depression screen negative  (In-House)                  Other Orders:       2028F  Foot examination performed (includes examination through visual inspection, sensory exam with monofilament, and pulse exam - report when any of the three components are completed) (DM)4  (In-House)

## 2021-05-18 NOTE — PROGRESS NOTES
[place], Nolan CHUNGMarshall 1960     Office/Outpatient Visit    Visit Date: Fri, Aug 16, 2019 09:52 am    Provider: Greta Lawrence N.P. (Assistant: Kathy Ramirez MA)    Location: [facility]        Electronically signed by Greta Lawrence N.P. on  08/16/2019 10:47:11 AM                             SUBJECTIVE:        CC:     Haja is a 59 year old White male.  Patient presents today for medication refills         HPI:         Patient to be evaluated for type 2 diabetes.  Current meds include an oral hypoglycemic ( Glucophage ).  He reports home blood glucose readings have been excellent, with average fasting glucoses running <120 mg/dL.  Most recent lab results include Estimated Avg Glucose:  146 (mg/dL) (03/22/2019), Hemoglobin A1c:  6.7 (%) (03/22/2019).  In regard to preventative care, Has not yet had diabetic eye exam..  Met with dietician but did not find this helpful and therefore did not meet with diabetes educator.      ROS:     CONSTITUTIONAL:  Negative for chills and fever.      CARDIOVASCULAR:  Negative for chest pain and palpitations.      RESPIRATORY:  Negative for dyspnea and frequent wheezing.      GASTROINTESTINAL:  Negative for abdominal pain and vomiting.      NEUROLOGICAL:  Negative for dizziness and headaches.      PSYCHIATRIC:  Negative for depression and suicidal thoughts.          PMH/FMH/SH:     Last Reviewed on 8/16/2019 10:14 AM by Greta Lawrence    Past Medical History:             PAST MEDICAL HISTORY         Gout         PREVENTIVE HEALTH MAINTENANCE             COLORECTAL CANCER SCREENING: Up to date (colonoscopy q10y; sigmoidoscopy q5y; Cologuard q3y) was last done 7/17/19, Results are in chart; colonoscopy with the following abnormalities noted-- Polyp(s) and Diverticulosis; The next colonoscopy is due  2022     PSA: was last done 3/2/19 with normal results         Surgical History:         right knee for meniscus tear 2005;         Family History:         Mother: Cause of death  was MI     Brother(s): Diabetes         Social History:     Occupation: Retired (Prior occupation: teacher, )     Marital Status:      Children: 1 child         Tobacco/Alcohol/Supplements:     Last Reviewed on 8/16/2019 09:56 AM by Kathy Ramirez    Tobacco: He has never smoked.          Alcohol: Frequency: Socially When he drinks, the average quantity of alcohol is 1 drinks.   He typically consumes beer.          Substance Abuse History:     None         Mental Health History:     NEGATIVE         Communicable Diseases (eg STDs):     Reportable health conditions; NEGATIVE             Current Problems:     Type 2 diabetes     Gout, unspecified     Screening for colon cancer     Screening for depression         Immunizations:     None        Allergies:     Last Reviewed on 8/16/2019 09:55 AM by Kathy Ramirez      No Known Drug Allergies.         Current Medications:     Last Reviewed on 8/16/2019 09:55 AM by Kathy Ramirez    Lancet   Lancet 1-2 times daily w/ one touch verio Dx E11.9     Multivitamin po qd     Osteo Bi-Flex po qd     Tumeric po qd     Metformin HCl 500mg Tablet 1 tab bid         OBJECTIVE:        Vitals:         Historical:     04/01/2019  BP:   127/88 mm Hg ( (left arm, , sitting, );)     04/01/2019  P:   106bpm ( (left arm (BP Cuff), , sitting, );)     04/01/2019  Wt:   271.2lbs        Current: 8/16/2019 9:57:07 AM    Ht:  6 ft, 1 in;  Wt: 256.8 lbs;  BMI: 33.9    T: 97.9 F (oral);  BP: 113/84 mm Hg (left arm, sitting);  P: 90 bpm (left arm (BP Cuff), sitting);  sCr: 1.18 mg/dL;  GFR: 79.24        Exams:     PHYSICAL EXAM:     GENERAL: well developed, well nourished;  no apparent distress;     RESPIRATORY: normal respiratory rate and pattern with no distress; normal breath sounds with no rales, rhonchi, wheezes or rubs;     CARDIOVASCULAR: normal rate; rhythm is regular;     MUSCULOSKELETAL: normal gait;     NEUROLOGIC: mental status: alert and oriented x 3; GROSSLY INTACT      PSYCHIATRIC: appropriate affect and demeanor;     Left foot exam    Protective sensation using Monofilament test: NORMAL sensation. Patient detects .07 grams of force which is considered normal.    Vascular status: normal peripheral vascular exam with palpable dorsal pedal and posterior tibal pulses and brisk digital capillary refill    Skin is intact without sores or ulcers    Right foot exam    Protective sensation using Monofilament test: NORMAL sensation. Patient detects .07 grams of force which is considered normal.    Vascular status: normal peripheral vascular exam with palpable dorsal pedal and posterior tibal pulses and brisk digital capillary refill    Skin is intact without sores or ulcers         ASSESSMENT           250.00   E11.9  Type 2 diabetes              DDx:     V12.72   Z86.010  History of colonic polyps              DDx:     562.10   K57.30  Diverticulosis              DDx:         ORDERS:         Meds Prescribed:       Refill of: Metformin HCl 500mg Tablet 1 tab bid  #180 (One [place] [address]) tablet(s) Refills: 1       Pravastatin 10mg Tablet 1 tab daily  #90 (Ninety) tablet(s) Refills: 1         Radiology/Test Orders:       3017F  Colorectal CA screen results documented and reviewed (PV)  (In-House)           Lab Orders:       APPTO  Appointment need  (In-House)         86186  DIAB2 - [facility] CMP A1C LIPID AND MICRO ALBUM CR RATIO: 98179,71539,03198,71027,73703  (Send-Out)           Other Orders:       2028F  Foot examination performed (includes examination through visual inspection, sensory exam with monofi  (In-House)         DLEYE  Dilated Eye Exam  (Send-Out)                   PLAN:          Type 2 diabetes Will start statin with hx diabetes to reduce risk for CV events.     LABORATORY:  Labs ordered to be performed today include Diabetes Panel 2;CMP, A1C, Lipid, Microalbumin:Creatinine Ratio.      FOLLOW-UP: Schedule a follow-up visit in 6 months.      RECOMMENDATIONS: instructed in use  "of glucometer ( check glucose daily at random intervals ), adherance to Low carb, NCS diet diet,  HgbA1C every 6 months, urine microalbumin test yearly, annual monofilament test for evaluating sensation in feet, daily foot self-inspection, yearly dental exams, annual eye exams, and need for yearly flu shots.            Prescriptions:       Refill of: Metformin HCl 500mg Tablet 1 tab bid  #180 (One [place] [address]) tablet(s) Refills: 1       Pravastatin 10mg Tablet 1 tab daily  #90 (Ninety) tablet(s) Refills: 1           Orders:       APPTO  Appointment need  (In-House)         46719  DIAB2 - [facility] CMP A1C LIPID AND MICRO ALBUM CR RATIO: 04951,60118,88146,85792,45554  (Send-Out)         DLEYE  Dilated Eye Exam  (Send-Out)            History of colonic polyps To repeat colonoscopy in 3 years.     MIPS Vaccines Flu and Pneumonia updated in Shot record Colorectal Cancer Screening is up to date and the results are in the chart           Orders:       3017F  Colorectal CA screen results documented and reviewed (PV)  (In-House)            Diverticulosis As above             Other Orders:       2028F  Foot examination performed (includes examination through visual inspection, sensory exam with monofi  (In-House)           Patient Recommendations:        For  Type 2 diabetes:     Schedule a follow-up visit in 6 months.  Obtain instruction in the proper use of a home blood glucose monitor. Follow a diabetic diet as directed. Have blood checked regularly for long term glucose control (a test called \"hemoglobin A1C\"). In your case, the hemoglobin A1C should be checked at least every 6 months. Have your urine regularly tested to check for protein, which is an early sign of diabetic kidney problems. Get this urine protein test done every year. Have an annual monofilament test to check for diabetes-related sensory nerve disturbance in the feet. Examine your feet daily.  Small cuts and injuries often go unnoticed and can lead to " serious infections. Have an annual dental exam. Have an annual eye exam. Obtain a flu shot each year.              CHARGE CAPTURE           **Please note: ICD descriptions below are intended for billing purposes only and may not represent clinical diagnoses**        Primary Diagnosis:         250.00 Type 2 diabetes            E11.9    Type 2 diabetes mellitus without complications              Orders:          67086   Office/outpatient visit; established patient, level 4 (28 minutes)  (In-House)             APPTO   Appointment need  (In-House)           V12.72 History of colonic polyps            Z86.010    Personal history of colonic polyps              Orders:          3017F   Colorectal CA screen results documented and reviewed (PV)  (In-House)           562.10 Diverticulosis            K57.30    Diverticulosis of large intestine without perforation or abscess without bleeding        Other Orders:           2028F   Foot examination performed (includes examination through visual inspection, sensory exam with monofi  (In-House)

## 2021-05-18 NOTE — PROGRESS NOTES
Nolan Browning 1960     Office/Outpatient Visit    Visit Date: Mon, Apr 1, 2019 09:26 am    Provider: Greta Lawrence N.P. (Assistant: Sandra Travis MA)    Location: [facility]        Electronically signed by Greta Lawrence N.P. on  04/01/2019 11:21:36 AM                             SUBJECTIVE:        CC:     Haja is a 59 year old White male.  This is a follow-up visit.  labs         HPI:     Haja is here today to follow up on his lab work that he had done last month. PSA normal. Kidney function, liver function, electrolytes normal. Triglycerides with some elevation. Fasting blood sugar elevated at 117. HgbA1C elevated at 6.7 indicating diabetes. No prior history of diabetes. Currently on no medications.     ROS:     CONSTITUTIONAL:  Negative for chills and fever.      EYES:  Negative for blurred vision and eye drainage.      CARDIOVASCULAR:  Negative for chest pain and palpitations.      RESPIRATORY:  Negative for dyspnea and frequent wheezing.      NEUROLOGICAL:  Negative for dizziness and headaches.      PSYCHIATRIC:  Negative for depression and suicidal thoughts.          PMH/FMH/SH:     Last Reviewed on 3/15/2019 03:01 PM by Greta Lawrence    Past Medical History:             PAST MEDICAL HISTORY         Gout         PREVENTIVE HEALTH MAINTENANCE             COLORECTAL CANCER SCREENING: declines colorectal cancer screening, understands reason for testing     PSA: was last done 3/2/19 with normal results         Surgical History:         right knee for meniscus tear 2005;         Family History:         Mother: Cause of death was MI     Brother(s): Diabetes         Social History:     Occupation: Retired (Prior occupation: teacher, )     Marital Status:      Children: 1 child         Tobacco/Alcohol/Supplements:     Last Reviewed on 4/01/2019 09:29 AM by Sandra Travis    Tobacco: He has never smoked.          Alcohol: Frequency: Socially When he drinks, the average  quantity of alcohol is 1 drinks.   He typically consumes beer.          Substance Abuse History:     None         Mental Health History:     NEGATIVE         Communicable Diseases (eg STDs):     Reportable health conditions; NEGATIVE             Current Problems:     Type 2 diabetes     Gout, unspecified     Diastasis recti     Screening for prostate cancer     Screening for cardiovascular conditions     Screening for colon cancer     Screening for depression         Immunizations:     None        Allergies:     Last Reviewed on 4/01/2019 09:29 AM by Sandra Travis      No Known Drug Allergies.         Current Medications:     Last Reviewed on 4/01/2019 09:29 AM by Sandra Travis    Multivitamin po qd     Osteo Bi-Flex po qd     Tumeric po qd         OBJECTIVE:        Vitals:         Historical:     03/15/2019  BP:   128/82 mm Hg ( (left arm, , sitting, );)     03/15/2019  Wt:   275.4lbs        Current: 4/1/2019 9:30:43 AM    Ht:  6 ft, 1 in;  Wt: 271.2 lbs;  BMI: 35.8    T: 97 F (oral);  BP: 127/88 mm Hg (left arm, sitting);  P: 106 bpm (left arm (BP Cuff), sitting);  sCr: 1.18 mg/dL;  GFR: 81.10        Exams:     PHYSICAL EXAM:     GENERAL: well developed, well nourished;  no apparent distress;     RESPIRATORY: normal respiratory rate and pattern with no distress; normal breath sounds with no rales, rhonchi, wheezes or rubs;     CARDIOVASCULAR: normal rate; rhythm is regular;     NEUROLOGIC: mental status: alert and oriented x 3; GROSSLY INTACT     PSYCHIATRIC: appropriate affect and demeanor;         ASSESSMENT           250.00   E11.9  Type 2 diabetes              DDx:         ORDERS:         Meds Prescribed:       Glucose Reagent Blood Test Strips (Glucose Reagent Blood Test Strips) Reagent Strips Check blood sugar 1-2 times per day E11.9  #100 (One [place]) strip(s) Refills: 0       Metformin HCl 500mg Tablet 1 tab bid  #60 (Sixty) tablet(s) Refills: 2       Lancet  Lancet 1-2 times daily w/ one touch  verio Dx E11.9  #100 (One [place]) lancet Refills: 5       Blood Glucose Meter Kit blood glucose meter E11.9 use as directed, substitute according to Insurance plan  #1 (One) kit(s) Refills: 0         Lab Orders:       APPTO  Appointment need  (In-House)           Procedures Ordered:       REFER  Referral to Specialist or Other Facility  (Send-Out)           Other Orders:       DLEYE  Dilated Eye Exam  (Send-Out)                   PLAN: Has appointment with general surgeon for colon CA screening.          Type 2 diabetes Educational handouts provided to patient.         REFERRALS:  Referral initiated to Diabetic Educator and Dietitian.      FOLLOW-UP: Schedule a follow-up visit in 3 months.      RECOMMENDATIONS: instructed in use of glucometer ( check glucose daily at random intervals ), adherance to Low carb, NCS diet diet, a graduated exercise program, daily foot self-inspection, yearly dental exams, and annual eye exams.            Prescriptions:       Glucose Reagent Blood Test Strips (Glucose Reagent Blood Test Strips) Reagent Strips Check blood sugar 1-2 times per day E11.9  #100 (One [place]) strip(s) Refills: 0       Metformin HCl 500mg Tablet 1 tab bid  #60 (Sixty) tablet(s) Refills: 2       Lancet  Lancet 1-2 times daily w/ one touch verio Dx E11.9  #100 (One [place]) lancet Refills: 5       Blood Glucose Meter Kit blood glucose meter E11.9 use as directed, substitute according to Insurance plan  #1 (One) kit(s) Refills: 0           Orders:       REFER  Referral to Specialist or Other Facility  (Send-Out)         DLEYE  Dilated Eye Exam  (Send-Out)         APPTO  Appointment need  (In-House)             Patient Education Handouts:       Diabetes (Foot and Skin Problems)        Diabetes - Create Your Own Plate - [facility]              Patient Recommendations:        For  Type 2 diabetes:     Obtain instruction in the proper use of a home blood glucose monitor. Follow a diabetic diet as directed. Maintain an  exercise regimen as directed. Examine your feet daily.  Small cuts and injuries often go unnoticed and can lead to serious infections. Have an annual dental exam. Have an annual eye exam.  Schedule a follow-up visit in 3 months.              CHARGE CAPTURE           **Please note: ICD descriptions below are intended for billing purposes only and may not represent clinical diagnoses**        Primary Diagnosis:         250.00 Type 2 diabetes            E11.9    Type 2 diabetes mellitus without complications              Orders:          46650   Office/outpatient visit; established patient, level 3 (33 minutes)  (In-House)             APPTO   Appointment need  (In-House)

## 2021-05-18 NOTE — PROGRESS NOTES
Nolan Browning 1960     Office/Outpatient Visit    Visit Date: Fri, Mar 15, 2019 02:37 pm    Provider: Greta Lawrence N.P. (Assistant: Kathy Ramirez MA)    Location: [facility]        Electronically signed by Greta Lawrence N.P. on  03/15/2019 03:48:07 PM                             SUBJECTIVE:        CC:     Mr. Browning is a 59 year old White male.  This is his first visit to the clinic.  Patient presents today for new patient establishment, also has complaints of gout         HPI:         PHQ-9 Depression Screening: Completed form scanned and in chart; Total Score 1 Alcohol Consumption Screening: Completed form scanned and in chart; Total Score 1     Haja presents for gout. Dr. Erickson first diagnosed gout many years ago. This flare up started about one week ago. Left great toe and now traveling up to ankle. Has had several flare ups for which he took Ibuprofen and a prescription medication unsure of name in the past.     Also reports abdominal pouching. Especially when going from lying to sitting position. Has had for about 5 years. Denies pain but wanted to get it checked out.          ROS:     CONSTITUTIONAL:  Negative for chills and fever.      EYES:  Negative for blurred vision and eye drainage.      E/N/T:  Negative for ear pain and sore throat.      CARDIOVASCULAR:  Negative for chest pain and palpitations.      RESPIRATORY:  Negative for dyspnea and frequent wheezing.      GASTROINTESTINAL:  Positive for abdominal pouching.   Negative for abdominal pain or vomiting.      MUSCULOSKELETAL:  Negative for joint stiffness and myalgias.      INTEGUMENTARY:  Negative for rash.      NEUROLOGICAL:  Negative for dizziness and headaches.      PSYCHIATRIC:  Negative for depression and suicidal thoughts.          PMH/FMH/SH:     Last Reviewed on 3/15/2019 03:01 PM by Greta Lawrence    Past Medical History:             PAST MEDICAL HISTORY         Gout         PREVENTIVE HEALTH MAINTENANCE              COLORECTAL CANCER SCREENING: declines colorectal cancer screening, understands reason for testing     PSA: has never been done         Surgical History:         right knee for meniscus tear 2005;         Family History:         Mother: Cause of death was MI         Social History:     Occupation: Retired (Prior occupation: teacher, )     Marital Status:      Children: 1 child         Tobacco/Alcohol/Supplements:     Last Reviewed on 3/15/2019 02:41 PM by Kathy Ramirez    Tobacco: He has never smoked.          Alcohol: Frequency: Socially When he drinks, the average quantity of alcohol is 1 drinks.   He typically consumes beer.          Substance Abuse History:     None         Mental Health History:     NEGATIVE         Communicable Diseases (eg STDs):     Reportable health conditions; NEGATIVE             Current Problems:     Gout, unspecified     Screening for prostate cancer     Screening for cardiovascular conditions     Hernia, NOS     Screening for colon cancer     Screening for depression         Immunizations:     None        Allergies:     Last Reviewed on 3/15/2019 02:40 PM by Kathy Ramirez      No Known Drug Allergies.         Current Medications:     Last Reviewed on 3/15/2019 02:41 PM by Kathy Ramirez    Multivitamin po qd     Osteo Bi-Flex po qd     Tumeric po qd         OBJECTIVE:        Vitals:         Current: 3/15/2019 2:42:52 PM    Ht:  6 ft, 1 in;  Wt: 275.4 lbs;  BMI: 36.3    T: 98.5 F (oral);  BP: 128/82 mm Hg (left arm, sitting);  P: 108 bpm (left arm (BP Cuff), sitting)        Exams:     PHYSICAL EXAM:     GENERAL: well developed, well nourished;  no apparent distress;     EYES: PERRL, EOMI     NECK: range of motion is normal; trachea is midline;     RESPIRATORY: normal respiratory rate and pattern with no distress; normal breath sounds with no rales, rhonchi, wheezes or rubs;     CARDIOVASCULAR: normal rate; rhythm is regular;     GASTROINTESTINAL: nontender; normal  bowel sounds; no organomegaly; ventral hernia present;     SKIN:  no significant rashes or lesions; no suspicious moles;     MUSCULOSKELETAL: gait: affected by a left leg limp;  normal range of motion of all major muscle groups; left foot tenderness;     NEUROLOGIC: mental status: alert and oriented x 3; GROSSLY INTACT     PSYCHIATRIC: appropriate affect and demeanor;         ASSESSMENT           274.9   M10.9  Gout, unspecified              DDx:     V79.0   Z13.89  Screening for depression              DDx:     553.9   K46.9  Hernia, NOS              DDx:     V76.49   Z12.11  Screening for colon cancer              DDx:     V81.2   Z13.6  Screening for cardiovascular conditions              DDx:     V76.44   Z12.5  Screening for prostate cancer              DDx:         ORDERS:         Meds Prescribed:       Prednisone 10mg Tablet take 6 pills today, 5 pills tomorrow, 4 pills day 3, 3 pills day 4, 2 pills day 5 and 1 pill day 6  #21 (Twenty One) tablet(s) Refills: 0         Radiology/Test Orders:       [id number]  Ultrasound, abdominal  (Send-Out)           Lab Orders:       [id number]  [facility] Comp. Metabolic Panel  (Send-Out)         57412  [facility] Lipid Panel  (Send-Out)         *  [facility] Medicare screening PSA  (Send-Out)           Procedures Ordered:       REFER  Referral to Specialist or Other Facility  (Send-Out)           Other Orders:         Depression screen negative  (In-House)           Negative EtOH screen  (In-House)           Calculated BMI above the upper parameter and a follow-up plan was documented in the medical record  (In-House)                   PLAN:          Gout, unspecified         RECOMMENDATIONS given include: Discussed low purine diet. Limit red meat and beer..      FOLLOW-UP: Schedule follow-up appointments on a p.r.n. basis. for Annual Checkup           Prescriptions:       Prednisone 10mg Tablet take 6 pills today, 5 pills tomorrow, 4 pills day 3, 3 pills day 4, 2  pills day 5 and 1 pill day 6  #21 (Twenty One) tablet(s) Refills: 0          Screening for depression     MIPS PHQ-9 Depression Screening Completed form scanned and in chart; Total Score 0 Negative alcohol screen     BMI Elevated - Follow-Up Plan: He was provided education on weight loss strategies           Orders:         Depression screen negative  (In-House)           Negative EtOH screen  (In-House)           Calculated BMI above the upper parameter and a follow-up plan was documented in the medical record  (In-House)            Hernia, NOS         RADIOLOGY:  I have ordered an abdominal ultrasound to be done today.      RECOMMENDATIONS given include: Further recommendation to be given after test results are complete.            Orders:       [id number]  Ultrasound, abdominal  (Send-Out)            Screening for colon cancer         REFERRALS:  Referral initiated to a general surgeon ( Dr. Jorge Prajapati; a colonoscopy ).            Orders:       REFER  Referral to Specialist or Other Facility  (Send-Out)            Screening for cardiovascular conditions     LABORATORY:  Labs ordered to be performed today include Comprehensive metabolic panel and lipid panel.      RECOMMENDATIONS given include: Further recommendation to be given after test results are complete.            Orders:       [id number]  [facility] Comp. Metabolic Panel  (Send-Out)         48103  [facility] Lipid Panel  (Send-Out)            Screening for prostate cancer     LABORATORY:  Labs ordered to be performed today include PSA.      RECOMMENDATIONS given include: Further recommendation to be given after test results are complete.            [facility]       [facility]               Patient Recommendations:        For  Gout, unspecified:     Schedule follow-up appointments as needed.              CHARGE CAPTURE           **Please note: ICD descriptions below are intended for billing purposes only and may  not represent clinical diagnoses**        Primary Diagnosis:         274.9 Gout, unspecified            M10.9    Gout, unspecified              Orders:          81614   Office visit - new pt, level 3  (In-House)           V79.0 Screening for depression            Z13.89    Encounter for screening for other disorder              Orders:             Depression screen negative  (In-House)                Negative EtOH screen  (In-House)                Calculated BMI above the upper parameter and a follow-up plan was documented in the medical record  (In-House)           553.9 Hernia, NOS            K46.9    Unspecified abdominal hernia without obstruction or gangrene    V76.49 Screening for colon cancer            Z12.11    Encounter for screening for malignant neoplasm of colon    V81.2 Screening for cardiovascular conditions            Z13.6    Encounter for screening for cardiovascular disorders    V76.44 Screening for prostate cancer            Z12.5    Encounter for screening for malignant neoplasm of prostate

## 2021-05-18 NOTE — PROGRESS NOTES
Nolan Browning  1960     Office/Outpatient Visit    Visit Date: Wed, Aug 19, 2020 08:42 am    Provider: Greta Lawrence N.P. (Assistant: Kathy Ramirez MA)    Location: [facility]        Electronically signed by Greta Lawrence N.P. on  08/19/2020 09:25:04 AM                             Subjective:        CC: Haja is a 60 year old White male.  Patient presents today for physical exam         HPI:           Patient to be evaluated for encounter for general adult medical examination without abnormal findings.  He underwent colonoscopy in 7/17/2019 (polyp, diverticulosis).  Depression screen is performed and is negative.  He is current with his influenza immunization.      Smoking Status:  Nonsmoker           PHQ-9 Depression Screening: Completed form scanned and in chart; Total Score 0 Patient to be evaluated for type 2 diabetes.  Current meds include an oral hypoglycemic ( Glucophage ) and a statin (Pravastatin) and an Ace inhibitor (Lisinopril).  He reports home blood glucose readings have been excellent, with average fasting glucoses running <120 mg/dL.  Most recent lab results include Hemoglobin A1c:  6.8 (%) (5/2020).  In regard to preventative care, Has not yet had diabetic eye exam but is scheduled next month with [facility].  Met with dietician but did not find this helpful and therefore did not meet with diabetes educator. Last in office foot exam 2/14/2020.    ROS:     CONSTITUTIONAL:  Negative for chills and fever.      EYES:  Negative for blurred vision and eye drainage.      E/N/T:  Negative for ear pain and sore throat.      CARDIOVASCULAR:  Negative for chest pain and palpitations.      RESPIRATORY:  Negative for dyspnea and frequent wheezing.      GASTROINTESTINAL:  Negative for abdominal pain and vomiting.      INTEGUMENTARY:  Negative for rash.      NEUROLOGICAL:  Negative for dizziness and headaches.      HEMATOLOGIC/LYMPHATIC:  Negative for easy bruising and  lymphadenopathy.      PSYCHIATRIC:  Negative for depression and suicidal thoughts.          Past Medical History / Family History / Social History:         Last Reviewed on 2/14/2020 09:52 AM by Greta Lawrence    Past Medical History:             PAST MEDICAL HISTORY         Gout         CURRENT MEDICAL PROVIDERS:    Orthopedist: Dr Pinon    Podiatrist - Melqiuades Noriega         PREVENTIVE HEALTH MAINTENANCE             COLORECTAL CANCER SCREENING: Up to date (colonoscopy q10y; sigmoidoscopy q5y; Cologuard q3y) was last done 7/17/19, Results are in chart; colonoscopy with the following abnormalities noted-- Polyp(s) and Diverticulosis; The next colonoscopy is due  2022     PSA: was last done 3/2/19 with normal results         Surgical History:         right knee for meniscus tear 2005;         Family History:         Mother: Cause of death was MI     Brother(s): Diabetes         Social History:     Occupation: Retired (Prior occupation: teacher, )     Marital Status:      Children: 1 child         Tobacco/Alcohol/Supplements:     Last Reviewed on 2/14/2020 08:39 AM by Kathy Ramirez    Tobacco: He has never smoked.          Alcohol: Frequency: Socially When he drinks, the average quantity of alcohol is 1 drinks.   He typically consumes beer.          Substance Abuse History:     None         Mental Health History:     NEGATIVE         Communicable Diseases (eg STDs):     Reportable health conditions; NEGATIVE         Current Problems:     Gout, unspecified    Encounter for screening for other disorder    Type 2 diabetes mellitus without complications    Diverticulosis of large intestine without perforation or abscess without bleeding    Personal history of colonic polyps    Encounter for immunization    Encounter for screening for depression        Immunizations:     influenza, injectable, quadrivalent, preservative free (FLUZONE QUAD 2417-7025) 2/14/2020        Allergies:     Last Reviewed on 8/19/2020 08:47  AM by Kathy Ramirez    No Known Allergies.        Current Medications:     Last Reviewed on 8/19/2020 08:47 AM by Kathy Ramirez    Osteo Bi-Flex po qd     Tumeric po qd     metFORMIN 500 mg oral tablet [1 tab bid]    pravastatin 10 mg oral tablet [take 1 tablet (10 mg) by oral route once daily]    lisinopriL 5 mg oral tablet [take 1 tablet (5 mg) by oral route once daily]        Objective:        Vitals:         Historical:     2/14/2020  BP:   117/75 mm Hg ( (left arm, , sitting, );) 2/14/2020  P:   96bpm ( (left arm (BP Cuff), , sitting, );) 2/14/2020  Wt:   260.6lbs    Current: 8/19/2020 8:48:22 AM    Ht:  6 ft, 1 in;  Wt: 259.6 lbs;  BMI: 34.2T: 97.5 F (temporal);  BP: 133/85 mm Hg (left arm, sitting);  P: 93 bpm (left arm (BP Cuff), sitting);  sCr: 1.07 mg/dL;  GFR: 86.75        Exams:     PHYSICAL EXAM:     GENERAL: well developed, well nourished;  no apparent distress;     EYES: PERRL, EOMI     E/N/T: EARS: external auditory canal normal;  bilateral TMs are normal;  NOSE: normal turbinates; no sinus tenderness; OROPHARYNX: oral mucosa is normal; posterior pharynx shows no exudate;     RESPIRATORY: normal respiratory rate and pattern with no distress; normal breath sounds with no rales, rhonchi, wheezes or rubs;     CARDIOVASCULAR: normal rate; rhythm is regular;     GASTROINTESTINAL: nontender; normal bowel sounds; no organomegaly;     MUSCULOSKELETAL: normal gait;     NEUROLOGIC: mental status: alert and oriented x 3; GROSSLY INTACT     PSYCHIATRIC: appropriate affect and demeanor; normal speech pattern; normal thought and perception;         Assessment:         Z00.00   Encounter for general adult medical examination without abnormal findings       Z13.31   Encounter for screening for depression       Z23   Encounter for immunization       E11.9   Type 2 diabetes mellitus without complications           ORDERS:         Meds Prescribed:       [Refilled] metFORMIN 500 mg oral tablet [1 tab bid], #180 (one  hundred and eighty) tablets, Refills: 1 (one)       [Refilled] pravastatin 10 mg oral tablet [take 1 tablet (10 mg) by oral route once daily], #90 (ninety) tablets, Refills: 1 (one)       [Refilled] lisinopriL 5 mg oral tablet [take 1 tablet (5 mg) by oral route once daily], #90 (ninety) tablets, Refills: 1 (one)         Radiology/Test Orders:       3017F  Colorectal CA screen results documented and reviewed (PV)  (In-House)              Lab Orders:       85226  [facility] MALE PHYSICAL: CMP, CBC,LIPID, PRSAS-, TSH 65235, 17166, 61028, 93099, , 22236  (Send-Out)            18581  [facility]  ([facility])            APPTO  Appointment need  (In-House)              Procedures Ordered:       50387  Adacel  (In-House)            30705  Immunization administration; one vaccine  (In-House)              Other Orders:         Depression screen negative  (In-House)            DLEYE  Dilated Eye Exam  (Send-Out)                      Plan:         Encounter for general adult medical examination without abnormal findings    LABORATORY:  Labs ordered to be performed today include MALE PHYSICAL: CMP, CBC, LIPID, PSA, TSH.  MIPS Vaccines Flu and Pneumonia updated in Shot record Colorectal Cancer Screening is up to date and the results are in the chart     COUNSELING was provided today regarding the following topics: healthy eating habits, regular exercise, and ADVISED TO SEE AN EYE DOCTOR AND A DENTIST REGULARLY.            Orders:       57437  [facility] MALE PHYSICAL: CMP, CBC,LIPID, PRSAS-, TSH 14757, 53368, 29343, 08175, , 04942  (Send-Out)            3017F  Colorectal CA screen results documented and reviewed (PV)  (In-House)              Encounter for screening for depression    MIPS PHQ-9 Depression Screening: Completed form scanned and in chart; Total Score 0; Negative Depression Screen           Orders:         Depression screen negative  (In-House)              Encounter for  immunization        IMMUNIZATIONS given today: Adacel.            Immunizations:       08483  Adacel  (In-House)                Dose (ml): 0.5  Site: right deltoid  Route: intramuscular  Administered by: Kathy Ramirez          : [employer]  Lot #: [id number]  Exp: 06/20/2022          NDC: [address]-39      82564  Immunization administration; one vaccine  (In-House)              Type 2 diabetes mellitus without complications    LABORATORY:  Labs ordered to be performed today include HgbA1C.      FOLLOW-UP: Schedule a follow-up visit in 6 months.      RECOMMENDATIONS: instructed in use of glucometer ( check glucose daily at random intervals ), adherance to Low carb, NCS diet diet,  HgbA1C every 6 months, urine microalbumin test yearly, annual monofilament test for evaluating sensation in feet, daily foot self-inspection, yearly dental exams, annual eye exams, and need for yearly flu shots.      FOLLOW-UP: Schedule a follow-up visit in 6 months.            Prescriptions:       [Refilled] metFORMIN 500 mg oral tablet [1 tab bid], #180 (one hundred and eighty) tablets, Refills: 1 (one)       [Refilled] pravastatin 10 mg oral tablet [take 1 tablet (10 mg) by oral route once daily], #90 (ninety) tablets, Refills: 1 (one)       [Refilled] lisinopriL 5 mg oral tablet [take 1 tablet (5 mg) by oral route once daily], #90 (ninety) tablets, Refills: 1 (one)           Orders:       [address]203  [address] Hemoglobin A1C  (Send-Out)            APPTO  Appointment need  (In-House)            DLEYE  Dilated Eye Exam  (Send-Out)                  Patient Recommendations:        For  Encounter for general adult medical examination without abnormal findings:    Limit dietary intake of fat (especially saturated fat) and cholesterol.  Eat a variety of foods, including plenty of fruits, vegetables, and grain containg fiber, limit fat intake to 30% of total calories. Balance caloric intake with energy expended. Maintaining regular  "physical activity is advised to help prevent heart disease, hypertension, diabetes, and obesity.          For  Type 2 diabetes mellitus without complications:    Schedule a follow-up visit in 6 months.  Obtain instruction in the proper use of a home blood glucose monitor. Follow a diabetic diet as directed. Have blood checked regularly for long term glucose control (a test called \"hemoglobin A1C\"). In your case, the hemoglobin A1C should be checked at least every 6 months. Have your urine regularly tested to check for protein, which is an early sign of diabetic kidney problems. Get this urine protein test done every year. Have an annual monofilament test to check for diabetes-related sensory nerve disturbance in the feet. Examine your feet daily.  Small cuts and injuries often go unnoticed and can lead to serious infections. Have an annual dental exam. Have an annual eye exam. Obtain a flu shot each year.  Schedule a follow-up visit in 6 months.              Charge Capture:         Primary Diagnosis:     Z00.00  Encounter for general adult medical examination without abnormal findings           Orders:      05182  Preventive medicine, established patient, age 40-64 years  (In-House)            3017F  Colorectal CA screen results documented and reviewed (PV)  (In-House)              Z13.31  Encounter for screening for depression           Orders:      57763-76  Office/outpatient visit; established patient, level 3  (In-House)              Depression screen negative  (In-House)              Z23  Encounter for immunization           Orders:      21000  Adacel  (In-House)            79249  Immunization administration; one vaccine  (In-House)              E11.9  Type 2 diabetes mellitus without complications           Orders:      APPTO  Appointment need  (In-House)                     "

## 2021-05-18 NOTE — PROGRESS NOTES
Nolan Browning  1960     Office/Outpatient Visit    Visit Date: Fri, Feb 19, 2021 08:38 am    Provider: Greta Lawrence N.P. (Assistant: Kathy Ramirez MA)    Location: [facility]        Electronically signed by Greta Lawrence N.P. on  02/19/2021 09:12:15 AM                             Subjective:        CC: Haja is a 60 year old White male.  Patient presents today for six month follow up         HPI: Patient to be evaluated for type 2 diabetes.  Current meds include an oral hypoglycemic ( Glucophage ) and a statin (Pravastatin) and an Ace inhibitor (Lisinopril).  He reports home blood glucose readings have been excellent, with average fasting glucoses running <120 mg/dL.  Most recent lab results include Hemoglobin A1c:  6.4 (%) (8/2020).  In regard to preventative care, he reports that he saw Dr Hanson 10/2020. Met with dietician but did not find this helpful and therefore did not meet with diabetes educator. He notes ongoing 'tickle in throat', dry cough. Has been told that could be related to his Lisinopril and wonders if this is true.    ROS:     CONSTITUTIONAL:  Negative for chills and fever.      CARDIOVASCULAR:  Negative for chest pain and palpitations.      RESPIRATORY:  Positive for recent cough ( typically dry ).   Negative for dyspnea or frequent wheezing.      MUSCULOSKELETAL:  Positive for right knee pain after fall- saw Dr Pinon - had steroid injection- feeling much better- has follow up scheduled April.      NEUROLOGICAL:  Negative for dizziness and headaches.      PSYCHIATRIC:  Negative for depression and suicidal thoughts.          Past Medical History / Family History / Social History:         Last Reviewed on 2/14/2020 09:52 AM by Greta Lawrence    Past Medical History:             PAST MEDICAL HISTORY         Gout         CURRENT MEDICAL PROVIDERS:    Orthopedist: Dr Pinon    Podiatrist - Melquiades Noriega         [facility]             COLORECTAL CANCER  SCREENING: Up to date (colonoscopy q10y; sigmoidoscopy q5y; Cologuard q3y) was last done 7/17/19, Results are in chart; colonoscopy with the following abnormalities noted-- Polyp(s) and Diverticulosis; The next colonoscopy is due  2022     PSA: was last done 8/24/2020 with normal results         Surgical History:         right knee for meniscus tear 2005;         Family History:         Mother: Cause of death was MI     Brother(s): Diabetes         Social History:     Occupation: Retired (Prior occupation: teacher, )     Marital Status:      Children: 1 child         Tobacco/Alcohol/Supplements:     Last Reviewed on 8/19/2020 08:47 AM by Kathy Ramirez    Tobacco: He has never smoked.          Alcohol: Frequency: Socially When he drinks, the average quantity of alcohol is 1 drinks.   He typically consumes beer.          Substance Abuse History:     None         Mental Health History:     NEGATIVE         Communicable Diseases (eg STDs):     Reportable health conditions; NEGATIVE         Current Problems:     Gout, unspecified    Encounter for screening for other disorder    Type 2 diabetes mellitus without complications    Diverticulosis of large intestine without perforation or abscess without bleeding    Personal history of colonic polyps    Encounter for screening for depression    Encounter for immunization    Encounter for general adult medical examination without abnormal findings        Immunizations:     influenza, injectable, quadrivalent, preservative free (FLUZONE QUAD 5602-8707) 2/14/2020    Tdap (ADACEL TDAP) 8/19/2020        Allergies:     Last Reviewed on 2/19/2021 08:41 AM by Kathy Ramirez    No Known Allergies.        Current Medications:     Last Reviewed on 2/19/2021 08:41 AM by Kathy Ramirez    Osteo Bi-Flex po qd     Tumeric po qd     metFORMIN 500 mg oral tablet [1 tab bid]    pravastatin 10 mg oral tablet [take 1 tablet (10 mg) by oral route once daily]    lisinopriL 5 mg oral  tablet [take 1 tablet (5 mg) by oral route once daily]    MELOXICAM    TAB 7.5MG  [take one tablet po qd]        Objective:        Vitals:         Historical:     8/19/2020  BP:   133/85 mm Hg ( (left arm, , sitting, );) 8/19/2020  P:   93bpm ( (left arm (BP Cuff), , sitting, );) 8/19/2020  Wt:   259.6lbs    Current: 2/19/2021 8:42:48 AM    Ht:  6 ft, 1 in;  Wt: 265.8 lbs;  BMI: 35.1T: 97.2 F (temporal);  BP: 112/80 mm Hg (left arm, sitting);  P: 97 bpm (left arm (BP Cuff), sitting);  sCr: 1.3 mg/dL;  GFR: 72.12        Exams:     PHYSICAL EXAM:     GENERAL: well developed, well nourished;  no apparent distress;     RESPIRATORY: normal respiratory rate and pattern with no distress; normal breath sounds with no rales, rhonchi, wheezes or rubs;     CARDIOVASCULAR: normal rate; rhythm is regular;     NEUROLOGIC: mental status: alert and oriented x 3; GROSSLY INTACT     PSYCHIATRIC: appropriate affect and demeanor;     Left foot exam    Protective sensation using Monofilament test: NORMAL sensation. Patient detects .07 grams of force which is considered normal.    Vascular status: normal peripheral vascular exam with palpable dorsal pedal and posterior tibal pulses and brisk digital capillary refill    Skin is intact without sores or ulcers    Right foot exam    Protective sensation using Monofilament test: NORMAL sensation. Patient detects .07 grams of force which is considered normal.    Vascular status: normal peripheral vascular exam with palpable dorsal pedal and posterior tibal pulses and brisk digital capillary refill    Skin is intact without sores or ulcers         Assessment:         E11.9   Type 2 diabetes mellitus without complications       Z13.31   Encounter for screening for depression           ORDERS:         Meds Prescribed:       [Refilled] metFORMIN 500 mg oral tablet [1 tab bid], #180 (one hundred and eighty) tablets, Refills: 1 (one)       [Refilled] pravastatin 10 mg oral tablet [take 1 tablet (10  mg) by oral route once daily], #90 (ninety) tablets, Refills: 1 (one)       [New Rx] losartan 25 mg oral tablet [take 1 tablet (25 mg) by oral route once daily ], #90 (ninety) tablets, Refills: 1 (one)         Radiology/Test Orders:       3017F  Colorectal CA screen results documented and reviewed (PV)  (In-House)            2022F  Dilated retinal eye exam w/interpret by ophthalmologist/optometrist documented/reviewed (DM)4  (In-House)              Lab Orders:       87956  DIAB2 - [facility] CMP A1C LIPID AND MICRO ALBUM CR RATIO: 29286,20765,95586,[id number]  (Send-Out)            APPTO  Appointment need  (In-House)              Other Orders:         Depression screen negative  (In-House)            2028F  Foot examination performed (includes examination through visual inspection, sensory exam with monofilament, and pulse exam - report when any of the three components are completed) (DM)4  (In-House)            DLEYE  Dilated Eye Exam  (Send-Out)                      Plan:         Type 2 diabetes mellitus without complicationsWill stop Lisinopril and start Losartan to see if there is improvement in throat tickle, dry cough. Discussed that symptom could also be related to allergies or acid reflux.    LABORATORY:  Labs ordered to be performed today include Diabetes Panel 2;CMP, A1C, Lipid, Microalbumin:Creatinine Ratio.  MIPS Vaccines Flu and Pneumonia updated in Shot record Colorectal Cancer Screening is up to date and the results are in the chart Diabetic Eye Exam during this calendar year and results are in chart     FOLLOW-UP: Schedule a follow-up visit in 6 months.:for Annual Checkup     RECOMMENDATIONS: instructed in use of glucometer ( check glucose daily at random intervals ), adherance to Low carb, NCS diet diet,  HgbA1C every 6 months, urine microalbumin test yearly, annual monofilament test for evaluating sensation in feet, daily foot self-inspection, yearly dental exams, annual eye exams, and need for  "yearly flu shots.      FOLLOW-UP: Schedule a follow-up visit in 6 months.            Prescriptions:       [Refilled] metFORMIN 500 mg oral tablet [1 tab bid], #180 (one hundred and eighty) tablets, Refills: 1 (one)       [Refilled] pravastatin 10 mg oral tablet [take 1 tablet (10 mg) by oral route once daily], #90 (ninety) tablets, Refills: 1 (one)       [New Rx] losartan 25 mg oral tablet [take 1 tablet (25 mg) by oral route once daily ], #90 (ninety) tablets, Refills: 1 (one)           Orders:       95375  DIAB - [facility] CMP A1C LIPID AND MICRO ALBUM CR RATIO: 16798,[id number],[id number]  (Send-Out)            APPTO  Appointment need  (In-House)            DLEYE  Dilated Eye Exam  (Send-Out)            3017F  Colorectal CA screen results documented and reviewed (PV)  (In-House)            2022F  Dilated retinal eye exam w/interpret by ophthalmologist/optometrist documented/reviewed (DM)4  (In-House)              Encounter for screening for depression    MIPS PHQ-9 Depression Screening: Completed form scanned and in chart; Total Score 0; Negative Depression Screen           Orders:         Depression screen negative  (In-House)                  Other Orders      2028F  Foot examination performed (includes examination through visual inspection, sensory exam with monofilament, and pulse exam - report when any of the three components are completed) (DM)4  (In-House)              Patient Recommendations:        For  Type 2 diabetes mellitus without complications:    Schedule a follow-up visit in 6 months.  Obtain instruction in the proper use of a home blood glucose monitor. Follow a diabetic diet as directed. Have blood checked regularly for long term glucose control (a test called \"hemoglobin A1C\"). In your case, the hemoglobin A1C should be checked at least every 6 months. Have your urine regularly tested to check for protein, which is an early sign of diabetic kidney problems. Get this urine protein test done " every year. Have an annual monofilament test to check for diabetes-related sensory nerve disturbance in the feet. Examine your feet daily.  Small cuts and injuries often go unnoticed and can lead to serious infections. Have an annual dental exam. Have an annual eye exam. Obtain a flu shot each year.  Schedule a follow-up visit in 6 months.              Charge Capture:         Primary Diagnosis:     E11.9  Type 2 diabetes mellitus without complications           Orders:      32673  Office/outpatient visit; established patient, level 4  (In-House)            APPTO  Appointment need  (In-House)            3017F  Colorectal CA screen results documented and reviewed (PV)  (In-House)            2022F  Dilated retinal eye exam w/interpret by ophthalmologist/optometrist documented/reviewed (DM)4  (In-House)              Z13.31  Encounter for screening for depression           Orders:        Depression screen negative  (In-House)                  Other Orders:       2028F  Foot examination performed (includes examination through visual inspection, sensory exam with monofilament, and pulse exam - report when any of the three components are completed) (DM)4  (In-House)

## 2021-06-02 ENCOUNTER — HOSPITAL ENCOUNTER (OUTPATIENT)
Dept: OTHER | Facility: HOSPITAL | Age: 61
Discharge: HOME | End: 2021-06-02
Attending: ORTHOPAEDIC SURGERY

## 2021-06-02 DIAGNOSIS — M25.562 LEFT KNEE PAIN, UNSPECIFIED CHRONICITY: Primary | ICD-10-CM

## 2021-06-17 ENCOUNTER — OFFICE VISIT (OUTPATIENT)
Dept: ORTHOPEDIC SURGERY | Facility: CLINIC | Age: 61
End: 2021-06-17

## 2021-06-17 VITALS — WEIGHT: 255 LBS | BODY MASS INDEX: 33.8 KG/M2 | HEIGHT: 73 IN | TEMPERATURE: 98.2 F

## 2021-06-17 DIAGNOSIS — M17.11 PRIMARY OSTEOARTHRITIS OF RIGHT KNEE: Primary | ICD-10-CM

## 2021-06-17 DIAGNOSIS — M25.461 EFFUSION OF RIGHT KNEE: ICD-10-CM

## 2021-06-17 PROCEDURE — 99213 OFFICE O/P EST LOW 20 MIN: CPT | Performed by: ORTHOPAEDIC SURGERY

## 2021-06-28 DIAGNOSIS — M17.11 PRIMARY OSTEOARTHRITIS OF RIGHT KNEE: ICD-10-CM

## 2021-06-28 RX ORDER — MELOXICAM 7.5 MG/1
TABLET ORAL
Qty: 30 TABLET | Refills: 3 | Status: SHIPPED | OUTPATIENT
Start: 2021-06-28 | End: 2021-12-02

## 2021-07-01 VITALS
TEMPERATURE: 97.9 F | SYSTOLIC BLOOD PRESSURE: 113 MMHG | WEIGHT: 256.8 LBS | HEART RATE: 90 BPM | BODY MASS INDEX: 34.03 KG/M2 | HEIGHT: 73 IN | DIASTOLIC BLOOD PRESSURE: 84 MMHG

## 2021-07-01 VITALS
TEMPERATURE: 98.5 F | HEIGHT: 73 IN | SYSTOLIC BLOOD PRESSURE: 128 MMHG | DIASTOLIC BLOOD PRESSURE: 82 MMHG | HEART RATE: 108 BPM | WEIGHT: 275.4 LBS | BODY MASS INDEX: 36.5 KG/M2

## 2021-07-01 VITALS
BODY MASS INDEX: 35.94 KG/M2 | DIASTOLIC BLOOD PRESSURE: 88 MMHG | HEART RATE: 106 BPM | HEIGHT: 73 IN | TEMPERATURE: 97 F | WEIGHT: 271.2 LBS | SYSTOLIC BLOOD PRESSURE: 127 MMHG

## 2021-07-02 VITALS
WEIGHT: 260.6 LBS | HEIGHT: 73 IN | DIASTOLIC BLOOD PRESSURE: 75 MMHG | SYSTOLIC BLOOD PRESSURE: 117 MMHG | TEMPERATURE: 98.5 F | BODY MASS INDEX: 34.54 KG/M2 | HEART RATE: 96 BPM

## 2021-07-02 VITALS
WEIGHT: 259.6 LBS | SYSTOLIC BLOOD PRESSURE: 133 MMHG | TEMPERATURE: 97.5 F | HEIGHT: 73 IN | DIASTOLIC BLOOD PRESSURE: 85 MMHG | HEART RATE: 93 BPM | BODY MASS INDEX: 34.4 KG/M2

## 2021-07-02 VITALS
TEMPERATURE: 97.2 F | HEIGHT: 73 IN | HEART RATE: 97 BPM | SYSTOLIC BLOOD PRESSURE: 112 MMHG | BODY MASS INDEX: 35.23 KG/M2 | DIASTOLIC BLOOD PRESSURE: 80 MMHG | WEIGHT: 265.8 LBS

## 2021-07-29 RX ORDER — BLOOD SUGAR DIAGNOSTIC
STRIP MISCELLANEOUS
Qty: 100 EACH | Refills: 1 | Status: SHIPPED | OUTPATIENT
Start: 2021-07-29 | End: 2022-09-08

## 2021-08-12 ENCOUNTER — OFFICE VISIT (OUTPATIENT)
Dept: FAMILY MEDICINE CLINIC | Age: 61
End: 2021-08-12

## 2021-08-12 ENCOUNTER — TELEPHONE (OUTPATIENT)
Dept: FAMILY MEDICINE CLINIC | Age: 61
End: 2021-08-12

## 2021-08-12 ENCOUNTER — HOSPITAL ENCOUNTER (OUTPATIENT)
Dept: ULTRASOUND IMAGING | Facility: HOSPITAL | Age: 61
Discharge: HOME | End: 2021-08-12

## 2021-08-12 ENCOUNTER — HOSPITAL ENCOUNTER (OUTPATIENT)
Dept: GENERAL RADIOLOGY | Facility: HOSPITAL | Age: 61
Discharge: HOME | End: 2021-08-12

## 2021-08-12 VITALS
SYSTOLIC BLOOD PRESSURE: 124 MMHG | DIASTOLIC BLOOD PRESSURE: 83 MMHG | HEIGHT: 73 IN | WEIGHT: 256.2 LBS | HEART RATE: 105 BPM | BODY MASS INDEX: 33.95 KG/M2

## 2021-08-12 DIAGNOSIS — M79.89 PAIN AND SWELLING OF LOWER LEG, LEFT: ICD-10-CM

## 2021-08-12 DIAGNOSIS — M79.89 PAIN AND SWELLING OF LOWER LEG, LEFT: Primary | ICD-10-CM

## 2021-08-12 DIAGNOSIS — M79.662 PAIN AND SWELLING OF LOWER LEG, LEFT: ICD-10-CM

## 2021-08-12 DIAGNOSIS — M17.11 PRIMARY OSTEOARTHRITIS OF RIGHT KNEE: ICD-10-CM

## 2021-08-12 DIAGNOSIS — M79.662 PAIN AND SWELLING OF LOWER LEG, LEFT: Primary | ICD-10-CM

## 2021-08-12 PROCEDURE — 99213 OFFICE O/P EST LOW 20 MIN: CPT | Performed by: FAMILY MEDICINE

## 2021-08-12 PROCEDURE — 93971 EXTREMITY STUDY: CPT

## 2021-08-12 RX ORDER — ALBUTEROL SULFATE 90 UG/1
AEROSOL, METERED RESPIRATORY (INHALATION)
COMMUNITY
Start: 2021-07-28 | End: 2021-09-22

## 2021-08-12 RX ORDER — AMOXICILLIN AND CLAVULANATE POTASSIUM 875; 125 MG/1; MG/1
TABLET, FILM COATED ORAL
COMMUNITY
Start: 2021-08-09 | End: 2021-08-16

## 2021-08-12 RX ORDER — GUAIFENESIN 600 MG/1
TABLET ORAL
COMMUNITY
Start: 2021-07-28 | End: 2021-08-12

## 2021-08-12 RX ORDER — LOSARTAN POTASSIUM 25 MG/1
TABLET ORAL
COMMUNITY
Start: 2021-07-30 | End: 2021-08-19

## 2021-08-12 RX ORDER — RIVAROXABAN 15 MG-20MG
KIT ORAL
Qty: 51 TABLET | Refills: 0 | Status: SHIPPED | OUTPATIENT
Start: 2021-08-12 | End: 2021-09-13 | Stop reason: SDUPTHER

## 2021-08-12 NOTE — PROGRESS NOTES
"[facility]  Office Visit Note    Nolan Browning presents to [facility]  Encounter Date: 08/12/2021     Chief Complaint  Edema (Left calf swelling, started about a week ago. Reports worsening and also started a tingling sensation.)    Subjective    History of Present Illness:  Haja presents clinic today for evaluation of a painful left swollen calf.  He said that his symptoms began approximately 1 week ago.  He denies any known inciting event or injury.  He said that he just noticed that his left calf became very swollen and tender.  Starting over the last 48 hours, he is also developed tingling in his left foot.  He has no prior history of clots.  Of note, he has been struggling with an upper respiratory infection with congestion and cough but denies shortness of breath.    Review of Systems:  Review of Systems   Constitutional: Negative for chills, fatigue and fever.   HENT: Positive for congestion.    Eyes: Negative for visual disturbance.   Respiratory: Positive for cough. Negative for shortness of breath.    Cardiovascular: Positive for leg swelling. Negative for chest pain and palpitations.   Gastrointestinal: Negative for abdominal pain, constipation, diarrhea, nausea and vomiting.   Musculoskeletal:        Positive for left lower extremity swelling and pain   Neurological: Negative for dizziness, weakness, numbness and headache.   Psychiatric/Behavioral: Negative for sleep disturbance, suicidal ideas and depressed mood. The patient is not nervous/anxious.         Objective   Vital Signs:  /83 (BP Location: Left arm, Patient Position: Sitting, Cuff Size: Large Adult)   Pulse 105   Ht 185.4 cm (73\")   Wt 116 kg (256 lb 3.2 oz)   BMI 33.80 kg/m²      Physical Examination:  Physical Exam  Vitals reviewed.   Constitutional:       General: He is not in acute distress.     Appearance: Normal appearance.   HENT:      Head: Normocephalic and " atraumatic.   Eyes:      Conjunctiva/sclera: Conjunctivae normal.   Cardiovascular:      Rate and Rhythm: Normal rate and regular rhythm.      Heart sounds: No murmur heard.     Pulmonary:      Effort: Pulmonary effort is normal. No respiratory distress.      Breath sounds: Normal breath sounds.   Musculoskeletal:      Right lower leg: No edema.      Left lower leg: Edema present.      Comments: Left calf measures 49 mm in circumference as opposed to right calf which measures only 43 mm.  Left calf is warmer to the touch than the right and is tender to palpation extending superiorly to the popliteal fossa   Skin:     General: Skin is warm and dry.      Findings: No rash.   Neurological:      General: No focal deficit present.      Mental Status: He is alert and oriented to person, place, and time.   Psychiatric:         Mood and Affect: Mood normal.         Behavior: Behavior normal.         Assessment and Plan:  Diagnoses and all orders for this visit:    1. Pain and swelling of lower leg, left (Primary)  -     My most pressing concern at this point is a DVT.  I have sent him for a stat venous duplex of the left lower extremity.  If there is no clot and symptoms persist, we will consider further work-up to look for other etiologies.  - US Venous Doppler Lower Extremity Left (duplex); Future        Return if symptoms worsen or fail to improve.    Patient was given instructions and counseling regarding his condition or for health maintenance advice. Please see specific information pulled into the AVS if appropriate.      Kris Butler MD   8/12/2021

## 2021-08-12 NOTE — TELEPHONE ENCOUNTER
Called and spoke with patient. Dr Butler sent in a new medication that should not require PA. Per Dr. Butler no compression socks. He is to elevated and use warm and cold compressions for the time being. Also scheduled follow up for next week with Dr. Butler as requested.

## 2021-08-12 NOTE — TELEPHONE ENCOUNTER
Caller: Nolan Browning    Relationship: Self    Best call back number: [phone]     What medication are you requesting:     What are your current symptoms: BLOOD CLOT    If a prescription is needed, what is your preferred pharmacy and phone number: [facility] - [place], [place] - [address] [phone] [facility] [phone] FX     Additional notes: PATIENT STATES THIS MEDICATION NEEDS A PRIOR AUTHORIZATION AND THE PHARMACIST TOLD HIM IT COULD TAKE UP TO 2 WEEKS TO GET. PATIENT WOULD LIKE A DIFFERENT MEDICATION CALLED IN.    PATIENT WOULD LIKE TO KNOW IF HE SHOULD WEAR COMPRESSION SOCKS TO SEE IF THAT WOULD HELP    PLEASE CALL AND ADVISE

## 2021-08-17 ENCOUNTER — TELEPHONE (OUTPATIENT)
Dept: FAMILY MEDICINE CLINIC | Age: 61
End: 2021-08-17

## 2021-08-17 NOTE — TELEPHONE ENCOUNTER
Caller: Nolan Browning     Relationship to patient: SELF     Best call back number: [phone]     Chief complaint: BLOOD LOT F/U     Type of visit: SCHEDULED AS SAME DAY     Requested date: ASAP      If rescheduling, when is the original appointment: 8/19     Additional notes: PATIENT STATED THAT HE RECEIVED A CALLTO RESCHEDULE HIS APPONTMENT FOR 8/19.     [facility] APPOINTMENT TYPE IS SAME DAY WHICH [facility] CANNOT SCHEDULE/RESCHEDULE UNLESS IT IS THEDAY OF.     IN ADDITION, THE OFFICE VISITS FOR DR. NIELSON WERE TOO FAR OUT AS THIS IS A FOLLOW UP APPOINTMET. PLEASE REACH OUT TO PATIENT TO RESCHEDULE ASAP. WARM TRANSFER TO THE OFFICE FAILED

## 2021-08-19 RX ORDER — LOSARTAN POTASSIUM 25 MG/1
TABLET ORAL
Qty: 90 TABLET | Refills: 1 | Status: SHIPPED | OUTPATIENT
Start: 2021-08-19 | End: 2021-08-26 | Stop reason: SDUPTHER

## 2021-08-26 ENCOUNTER — LAB (OUTPATIENT)
Dept: LAB | Facility: HOSPITAL | Age: 61
End: 2021-08-26

## 2021-08-26 ENCOUNTER — OFFICE VISIT (OUTPATIENT)
Dept: FAMILY MEDICINE CLINIC | Age: 61
End: 2021-08-26

## 2021-08-26 VITALS
HEART RATE: 92 BPM | WEIGHT: 263.4 LBS | DIASTOLIC BLOOD PRESSURE: 81 MMHG | SYSTOLIC BLOOD PRESSURE: 118 MMHG | TEMPERATURE: 98.1 F | HEIGHT: 73 IN | BODY MASS INDEX: 34.91 KG/M2

## 2021-08-26 DIAGNOSIS — E11.9 TYPE 2 DIABETES MELLITUS WITHOUT COMPLICATION, WITHOUT LONG-TERM CURRENT USE OF INSULIN (HCC): ICD-10-CM

## 2021-08-26 DIAGNOSIS — Z00.00 ANNUAL PHYSICAL EXAM: Primary | ICD-10-CM

## 2021-08-26 DIAGNOSIS — I82.412 ACUTE DEEP VEIN THROMBOSIS (DVT) OF FEMORAL VEIN OF LEFT LOWER EXTREMITY (HCC): ICD-10-CM

## 2021-08-26 DIAGNOSIS — Z11.59 SCREENING FOR VIRAL DISEASE: ICD-10-CM

## 2021-08-26 DIAGNOSIS — Z12.5 SCREENING FOR MALIGNANT NEOPLASM OF PROSTATE: ICD-10-CM

## 2021-08-26 DIAGNOSIS — Z00.00 ANNUAL PHYSICAL EXAM: ICD-10-CM

## 2021-08-26 PROBLEM — I82.409 DVT (DEEP VENOUS THROMBOSIS) (HCC): Status: ACTIVE | Noted: 2021-08-26

## 2021-08-26 LAB
ALBUMIN SERPL-MCNC: 4.1 G/DL (ref 3.5–5.2)
ALBUMIN/GLOB SERPL: 1.3 G/DL
ALP SERPL-CCNC: 99 U/L (ref 39–117)
ALT SERPL W P-5'-P-CCNC: 16 U/L (ref 1–41)
ANION GAP SERPL CALCULATED.3IONS-SCNC: 9.2 MMOL/L (ref 5–15)
AST SERPL-CCNC: 22 U/L (ref 1–40)
BASOPHILS # BLD AUTO: 0.06 10*3/MM3 (ref 0–0.2)
BASOPHILS NFR BLD AUTO: 0.8 % (ref 0–1.5)
BILIRUB SERPL-MCNC: 0.5 MG/DL (ref 0–1.2)
BUN SERPL-MCNC: 10 MG/DL (ref 8–23)
BUN/CREAT SERPL: 8.4 (ref 7–25)
CALCIUM SPEC-SCNC: 9.2 MG/DL (ref 8.6–10.5)
CHLORIDE SERPL-SCNC: 104 MMOL/L (ref 98–107)
CO2 SERPL-SCNC: 24.8 MMOL/L (ref 22–29)
CREAT SERPL-MCNC: 1.19 MG/DL (ref 0.76–1.27)
DEPRECATED RDW RBC AUTO: 45.1 FL (ref 37–54)
EOSINOPHIL # BLD AUTO: 0.61 10*3/MM3 (ref 0–0.4)
EOSINOPHIL NFR BLD AUTO: 7.8 % (ref 0.3–6.2)
ERYTHROCYTE [DISTWIDTH] IN BLOOD BY AUTOMATED COUNT: 13.5 % (ref 12.3–15.4)
GFR SERPL CREATININE-BSD FRML MDRD: 62 ML/MIN/1.73
GLOBULIN UR ELPH-MCNC: 3.1 GM/DL
GLUCOSE SERPL-MCNC: 178 MG/DL (ref 65–99)
HBA1C MFR BLD: 6.84 % (ref 4.8–5.6)
HCT VFR BLD AUTO: 42.6 % (ref 37.5–51)
HCV AB SER DONR QL: NORMAL
HGB BLD-MCNC: 14.3 G/DL (ref 13–17.7)
IMM GRANULOCYTES # BLD AUTO: 0.05 10*3/MM3 (ref 0–0.05)
IMM GRANULOCYTES NFR BLD AUTO: 0.6 % (ref 0–0.5)
LYMPHOCYTES # BLD AUTO: 1.26 10*3/MM3 (ref 0.7–3.1)
LYMPHOCYTES NFR BLD AUTO: 16.1 % (ref 19.6–45.3)
MCH RBC QN AUTO: 30.1 PG (ref 26.6–33)
MCHC RBC AUTO-ENTMCNC: 33.6 G/DL (ref 31.5–35.7)
MCV RBC AUTO: 89.7 FL (ref 79–97)
MONOCYTES # BLD AUTO: 0.53 10*3/MM3 (ref 0.1–0.9)
MONOCYTES NFR BLD AUTO: 6.8 % (ref 5–12)
NEUTROPHILS NFR BLD AUTO: 5.33 10*3/MM3 (ref 1.7–7)
NEUTROPHILS NFR BLD AUTO: 67.9 % (ref 42.7–76)
PLATELET # BLD AUTO: 238 10*3/MM3 (ref 140–450)
PMV BLD AUTO: 9.6 FL (ref 6–12)
POTASSIUM SERPL-SCNC: 3.9 MMOL/L (ref 3.5–5.2)
PROT SERPL-MCNC: 7.2 G/DL (ref 6–8.5)
PSA SERPL-MCNC: 0.61 NG/ML (ref 0–4)
RBC # BLD AUTO: 4.75 10*6/MM3 (ref 4.14–5.8)
SODIUM SERPL-SCNC: 138 MMOL/L (ref 136–145)
WBC # BLD AUTO: 7.84 10*3/MM3 (ref 3.4–10.8)

## 2021-08-26 PROCEDURE — 80053 COMPREHEN METABOLIC PANEL: CPT

## 2021-08-26 PROCEDURE — G0103 PSA SCREENING: HCPCS

## 2021-08-26 PROCEDURE — 36415 COLL VENOUS BLD VENIPUNCTURE: CPT

## 2021-08-26 PROCEDURE — 86803 HEPATITIS C AB TEST: CPT

## 2021-08-26 PROCEDURE — 83036 HEMOGLOBIN GLYCOSYLATED A1C: CPT

## 2021-08-26 PROCEDURE — 85025 COMPLETE CBC W/AUTO DIFF WBC: CPT

## 2021-08-26 PROCEDURE — 99396 PREV VISIT EST AGE 40-64: CPT | Performed by: NURSE PRACTITIONER

## 2021-08-26 RX ORDER — PRAVASTATIN SODIUM 10 MG
10 TABLET ORAL DAILY
Qty: 90 TABLET | Refills: 1 | Status: SHIPPED | OUTPATIENT
Start: 2021-08-26 | End: 2022-02-24 | Stop reason: SDUPTHER

## 2021-08-26 RX ORDER — LOSARTAN POTASSIUM 25 MG/1
25 TABLET ORAL DAILY
Qty: 90 TABLET | Refills: 1 | Status: SHIPPED | OUTPATIENT
Start: 2021-08-26 | End: 2021-10-06 | Stop reason: SDUPTHER

## 2021-08-26 NOTE — PROGRESS NOTES
"Chief Complaint  Annual Exam    Subjective          Nolan Browning presents to [facility]    Haja is here for annual preventive exam. UTD colonoscopy. 7/17/19 - polyps, diverticulosis. Repeat due 2022. Last PSA 8/24/2020.   UTD Tdap 8/19/20202.   Patient to be evaluated for type 2 diabetes.  Current meds include an oral hypoglycemic ( Glucophage ) and a statin (Pravastatin) and an Ace inhibitor (Lisinopril).  Most recent lab results include Hemoglobin A1c:  6.5 (%)  Foot exam 2/19/21. Last eye exam 10/26/2020 with Dr Hanson.     Review of Systems   Constitutional: Negative for chills and fever.   HENT: Negative for ear pain and sore throat.    Eyes: Negative for photophobia and visual disturbance.   Respiratory: Negative for cough and shortness of breath.    Cardiovascular: Negative for chest pain and palpitations.        Recent diagnosis of LLE DVT. On Xarelto. Swelling improved   Gastrointestinal: Negative for abdominal pain and rectal pain.   Skin: Negative for rash.   Neurological: Negative for seizures and syncope.   Psychiatric/Behavioral: Negative for hallucinations and suicidal ideas.      Objective   Vital Signs:   /81 (BP Location: Left arm, Patient Position: Sitting)   Pulse 92   Temp 98.1 °F (36.7 °C) (Oral)   Ht 185.4 cm (72.99\")   Wt 119 kg (263 lb 6.4 oz)   BMI 34.76 kg/m²       Physical Exam  Vitals reviewed.   Constitutional:       General: He is not in acute distress.     Appearance: Normal appearance.   HENT:      Head: Normocephalic and atraumatic.      Right Ear: Hearing, tympanic membrane and ear canal normal.      Left Ear: Hearing, tympanic membrane and ear canal normal.      Mouth/Throat:      Mouth: Mucous membranes are moist.   Eyes:      Extraocular Movements: Extraocular movements intact.      Pupils: Pupils are equal, round, and reactive to light.   Cardiovascular:      Rate and Rhythm: Normal rate and regular rhythm.      Comments: " Minimal edema noted to LLE, no erythema  Pulmonary:      Effort: Pulmonary effort is normal. No respiratory distress.      Breath sounds: Normal breath sounds.   Abdominal:      General: Bowel sounds are normal.      Palpations: Abdomen is soft.      Tenderness: There is no abdominal tenderness.   Musculoskeletal:         General: Normal range of motion.      Cervical back: Normal range of motion and neck supple.   Skin:     General: Skin is warm and dry.   Neurological:      Mental Status: He is alert and oriented to person, place, and time.   Psychiatric:         Mood and Affect: Mood normal.          Result Review :   The following data was reviewed by: RAYMON Lopez on 08/26/2021:  Microalbumin / Creatinine Urine Ratio - (02/22/2021 09:45)  Diabetes Panel 1 (02/22/2021 07:11)  US Venous Doppler Lower Extremity Left (duplex) (08/12/2021 12:59)           Assessment and Plan    Diagnoses and all orders for this visit:    1. Annual physical exam (Primary)  Comments:  UTD colonoscopy. Advise regular dental and optometry exams.  Orders:  -     CBC Auto Differential; Future  -     Comprehensive Metabolic Panel; Future    2. Screening for malignant neoplasm of prostate  -     PSA Screen; Future    3. Type 2 diabetes mellitus without complication, without long-term current use of insulin (CMS/[facility])  Comments:  Instructed in use of glucometer, adherance to Low carb, NCS diet, daily foot self-inspection, annual eye exams, and need for yearly flu shots.      Orders:  -     Hemoglobin A1c; Future  -     losartan (COZAAR) 25 MG tablet; Take 1 tablet by mouth Daily.  Dispense: 90 tablet; Refill: 1  -     metFORMIN (GLUCOPHAGE) 500 MG tablet; Take 1 tablet by mouth 2 (Two) Times a Day.  Dispense: 180 tablet; Refill: 1  -     pravastatin (PRAVACHOL) 10 MG tablet; Take 1 tablet by mouth Daily.  Dispense: 90 tablet; Refill: 1    4. Screening for viral disease  -     Hepatitis C Antibody; Future    5. Acute deep vein  thrombosis (DVT) of femoral vein of left lower extremity (CMS/HCC)  Comments:  Continue Xarelto for at least 6 months and will reassess at that time        Follow Up    Return in about 6 months (around 2/26/2022) for Recheck.  Patient was given instructions and counseling regarding his condition or for health maintenance advice. Please see specific information pulled into the AVS if appropriate.

## 2021-08-27 ENCOUNTER — OFFICE VISIT (OUTPATIENT)
Dept: FAMILY MEDICINE CLINIC | Age: 61
End: 2021-08-27

## 2021-08-27 VITALS
SYSTOLIC BLOOD PRESSURE: 100 MMHG | BODY MASS INDEX: 34.46 KG/M2 | WEIGHT: 260 LBS | HEIGHT: 73 IN | HEART RATE: 103 BPM | DIASTOLIC BLOOD PRESSURE: 81 MMHG

## 2021-08-27 DIAGNOSIS — I82.412 ACUTE DEEP VEIN THROMBOSIS (DVT) OF FEMORAL VEIN OF LEFT LOWER EXTREMITY (HCC): Primary | ICD-10-CM

## 2021-08-27 PROCEDURE — 99213 OFFICE O/P EST LOW 20 MIN: CPT | Performed by: FAMILY MEDICINE

## 2021-09-13 DIAGNOSIS — I82.412 ACUTE DEEP VEIN THROMBOSIS (DVT) OF FEMORAL VEIN OF LEFT LOWER EXTREMITY (HCC): Primary | ICD-10-CM

## 2021-09-13 RX ORDER — RIVAROXABAN 15 MG-20MG
KIT ORAL
Qty: 51 TABLET | Refills: 0 | OUTPATIENT
Start: 2021-09-13

## 2021-09-13 NOTE — PROGRESS NOTES
"[facility]  Office Visit Note    Nolan Browning presents to [facility]  Encounter Date: 08/27/2021     Chief Complaint  Follow-up (Blot clot left leg)    Subjective    History of Present Illness:  Haja presents clinic today as a follow-up of a left lower extremity DVT.He was diagnosed on 8/12/2021 after presenting to clinic with painful, red and swollen left lower extremity.  Venous Doppler ultimately showed an acute venous thrombosis of the deep femoral vein and left popliteal vein.  He has since been started on Xarelto and has been taking this as prescribed.  He has no easy bruising or bleeding.  He notes that the pain and swelling of his left lower extremity is improving every day.    Review of Systems:  Review of Systems   Constitutional: Negative for chills, fatigue and fever.   Eyes: Negative for visual disturbance.   Respiratory: Negative for cough and shortness of breath.    Cardiovascular: Negative for chest pain, palpitations and leg swelling.   Gastrointestinal: Negative for abdominal pain, constipation, diarrhea, nausea and vomiting.   Neurological: Negative for dizziness, weakness, numbness and headache.   Psychiatric/Behavioral: Negative for sleep disturbance, suicidal ideas and depressed mood. The patient is not nervous/anxious.         Objective   Vital Signs:  /81 (BP Location: Right arm, Patient Position: Sitting, Cuff Size: Large Adult)   Pulse 103   Ht 185.4 cm (73\")   Wt 118 kg (260 lb)   BMI 34.30 kg/m²      Physical Examination:  Physical Exam  Vitals reviewed.   Constitutional:       General: He is not in acute distress.     Appearance: Normal appearance.   HENT:      Head: Normocephalic and atraumatic.   Eyes:      Conjunctiva/sclera: Conjunctivae normal.   Cardiovascular:      Rate and Rhythm: Normal rate and regular rhythm.      Heart sounds: No murmur heard.     Pulmonary:      Effort: Pulmonary effort is normal. No " respiratory distress.      Breath sounds: Normal breath sounds.   Musculoskeletal:      Right lower leg: Edema present.      Left lower leg: Edema present.   Skin:     General: Skin is warm and dry.      Findings: No rash.   Neurological:      General: No focal deficit present.      Mental Status: He is alert and oriented to person, place, and time.   Psychiatric:         Mood and Affect: Mood normal.         Behavior: Behavior normal.         Result Review   The following data was reviewed by: Kris Butler MD on 08/27/2021:  US Venous Doppler Lower Extremity Left (duplex) (08/12/2021 12:59)    Procedures:    No procedures associated with this visit.       Assessment and Plan:  Diagnoses and all orders for this visit:    1. Acute deep vein thrombosis (DVT) of femoral vein of left lower extremity (CMS/HCC) (Primary)  -Pain and swelling are improving.  Continue Xarelto.  He will need to continue his for 6 months from the time of diagnosis.  Follow-up with PCP as needed.      Return if symptoms worsen or fail to improve.    Patient was given instructions and counseling regarding his condition or for health maintenance advice. Please see specific information pulled into the AVS if appropriate.      rKis Butler MD   9/12/2021

## 2021-09-13 NOTE — TELEPHONE ENCOUNTER
Rx Refill Note  Requested Prescriptions     Pending Prescriptions Disp Refills   • Rivaroxaban (Xarelto Starter Pack) tablet therapy pack starter pack 51 tablet 0     Sig: Take one 15 mg tablet twice daily with food for 21 days.  Followed by one 20 mg tablet by mouth once daily with food. Take as directed      Last office visit with prescribing clinician: 8/26/2021      Next office visit with prescribing clinician: 2/24/2022   LAST FILL-8/12/21  Marla Mena LPN  09/13/21, 15:01 EDT

## 2021-09-16 ENCOUNTER — TELEPHONE (OUTPATIENT)
Dept: FAMILY MEDICINE CLINIC | Age: 61
End: 2021-09-16

## 2021-09-16 NOTE — TELEPHONE ENCOUNTER
Patient has been informed that he can only have one PCP and has been scheduled with virgil caraballo on 9/22 at 2:15 re his blood clot.

## 2021-09-16 NOTE — TELEPHONE ENCOUNTER
Caller: Nolan Browning    Relationship: Self    Best call back number: [phone]    Who is your current provider: ITZEL CORCORAN/ DR SINCLAIR    Who would you like your new provider to be: DR PIERCE/ [facility] CORCORAN        Additional notes: PATIENT STATES THAT HE SEES ITZEL CORCORAN FOR HIS DIABETES AND HE WAS SEEING DR SINCLAIR FOR OTHER ISSUES BUT STILL WANTS TO SEE ITZEL CORCORAN FOR HIS DIABETES. PATIENT WOULD LIKE TO SEE DR PIERCE FOR OTHER ISSUES SINCE DR SINCLAIR IS NO LONGER WITH THE PRACTICE.

## 2021-09-22 ENCOUNTER — OFFICE VISIT (OUTPATIENT)
Dept: FAMILY MEDICINE CLINIC | Age: 61
End: 2021-09-22

## 2021-09-22 VITALS
BODY MASS INDEX: 34.96 KG/M2 | WEIGHT: 265 LBS | TEMPERATURE: 98 F | OXYGEN SATURATION: 96 % | DIASTOLIC BLOOD PRESSURE: 85 MMHG | HEART RATE: 76 BPM | SYSTOLIC BLOOD PRESSURE: 122 MMHG

## 2021-09-22 DIAGNOSIS — I82.412 ACUTE DEEP VEIN THROMBOSIS (DVT) OF FEMORAL VEIN OF LEFT LOWER EXTREMITY (HCC): Primary | ICD-10-CM

## 2021-09-22 PROCEDURE — 99213 OFFICE O/P EST LOW 20 MIN: CPT | Performed by: NURSE PRACTITIONER

## 2021-09-22 RX ORDER — GLUCOSAM/CHON-MSM1/C/MANG/BOSW 750-644 MG
TABLET ORAL DAILY
COMMUNITY

## 2021-09-22 NOTE — PROGRESS NOTES
Chief Complaint  Lower Extremity Issue (no improvement ) and Deep Vein Thrombosis    Subjective          Nolan Browning presents to [facility]    Haja was diagnosed with LLE DVT on 8/12/21 after presenting to clinic with painful, red, and swollen LLE. Venous Doppler ultimately showed an acute venous thrombosis of the deep femoral vein and left popliteal vein. He was started on Xarelto. He notes still with some swelling and tightness. Denies bleeding, bruising, SOA.           Objective   Vital Signs:   /85   Pulse 76   Temp 98 °F (36.7 °C)   Wt 120 kg (265 lb)   SpO2 96%   BMI 34.96 kg/m²       Physical Exam  Vitals reviewed.   Constitutional:       General: He is not in acute distress.     Appearance: Normal appearance. He is well-developed.   HENT:      Head: Normocephalic and atraumatic.   Cardiovascular:      Rate and Rhythm: Normal rate and regular rhythm.   Pulmonary:      Effort: Pulmonary effort is normal.      Breath sounds: Normal breath sounds.   Musculoskeletal:      Comments: LLE with mild swelling from knee down. Pedal pulses palpable. Brisk capillary refill.    Neurological:      Mental Status: He is alert and oriented to person, place, and time.   Psychiatric:         Mood and Affect: Mood and affect normal.          Result Review :   The following data was reviewed by: RAYMON Lopez on 09/22/2021:  US Venous Doppler Lower Extremity Left (duplex) (08/12/2021 12:59)           Assessment and Plan {WrapupProblem List  Visit Diagnosis  ROS  Review (Popup)  Health Maintenance  Quality  BestPractice  Medications  SmartSets  SnapShot Encounters  Media :23}   Diagnoses and all orders for this visit:    1. Acute deep vein thrombosis (DVT) of femoral vein of left lower extremity (HCC) (Primary)  Comments:  Continue Xarelto. Elevating lower extremities. Compression. ER precautions given.   Orders:  -     Ambulatory Referral to Vascular  Surgery        Follow Up    Return for Next scheduled follow up.  Patient was given instructions and counseling regarding his condition or for health maintenance advice. Please see specific information pulled into the AVS if appropriate.

## 2021-09-23 ENCOUNTER — OFFICE VISIT (OUTPATIENT)
Dept: ORTHOPEDIC SURGERY | Facility: CLINIC | Age: 61
End: 2021-09-23

## 2021-09-23 VITALS — HEIGHT: 73 IN | WEIGHT: 265 LBS | BODY MASS INDEX: 35.12 KG/M2 | TEMPERATURE: 97.5 F

## 2021-09-23 DIAGNOSIS — M17.11 PRIMARY OSTEOARTHRITIS OF RIGHT KNEE: Primary | ICD-10-CM

## 2021-09-23 DIAGNOSIS — M25.461 EFFUSION OF RIGHT KNEE: ICD-10-CM

## 2021-09-23 DIAGNOSIS — M17.12 PRIMARY OSTEOARTHRITIS OF LEFT KNEE: ICD-10-CM

## 2021-09-23 PROCEDURE — 99213 OFFICE O/P EST LOW 20 MIN: CPT | Performed by: ORTHOPAEDIC SURGERY

## 2021-09-23 NOTE — PROGRESS NOTES
"Chief Complaint  Follow-up and Pain of the Left Knee and Follow-up and Pain of the Right Knee    Subjective    History of Present Illness      Nolan Browning is a 61 y.o. male who presents to [facility] for follow-up on bilateral knee pain and discomfort.  History of Present Illness this patient is an established individual with osteoarthritis of both his knees.  He states that the right knee is a little bit more symptomatic than the left knee.  He has some difficulty going up and down the steps and difficulty with kneeling and squatting on the ground.  He has difficulty with walking on an incline surface.  The patient states that he is about status quo at this point.  He has tried intra-articular injections including viscosupplementation in the past.  That has been somewhat beneficial and helpful in managing his symptoms.  He states that he loves to get outside and golf and uses a brace on the knee to prevent it from buckling and giving out.  The brace seems to help him quite a bit.  He states that he understands he is going to need knee replacement surgery but would like to defer that for as long as possible.  Pain Location:  BILATERAL knee  Radiation: none  Quality: dull, aching  Intensity/Severity: mild-moderate  Duration: 4-5 months  Progression of symptoms: yes, progressive worsening  Onset quality: gradual   Timing: intermittent  Aggravating Factors: going up and down stairs, rising after sitting, walking, pivoting, golfing  Alleviating Factors: NSAIDs, brace, compression stockings  Previous Episodes: yes  Associated Symptoms: pain, swelling, decreased strength  ADLs Affected: ambulating, recreational activities/sports  Previous Treatment: NSAIDs and brace       Objective   Vital Signs:   Temp 97.5 °F (36.4 °C)   Ht 185.4 cm (72.99\")   Wt 120 kg (265 lb)   BMI 34.97 kg/m²     Physical Exam  Physical Exam  Vitals signs and nursing note reviewed.   Constitutional:       " Appearance: Normal appearance.   Pulmonary:      Effort: Pulmonary effort is normal.   Skin:     General: Skin is warm and dry.      Capillary Refill: Capillary refill takes less than 2 seconds.   Neurological:      General: No focal deficit present.      Mental Status: He is alert and oriented to person, place, and time. Mental status is at baseline.   Psychiatric:         Mood and Affect: Mood normal.         Behavior: Behavior normal.         Thought Content: Thought content normal.         Judgment: Judgment normal.     Ortho Exam   Bilateral knee (varus). Patient has crepitus throughout range of motion. Positive patellar grind test. Mild effusion. Lachman is negative. Pivot shift is negative. Anterior and posterior drawer signs are negative. Significant joint line tenderness is noted on the medial aspect of the knee. Patient has a varus orientation of the knee. There is fullness and tenderness in the Popliteal fossa. Mild distention of a Popliteal cyst is noted in this location. Range of motion in flexion is from 0-120 degrees. Neurovascular status is intact.  Dorsalis pedis and posterior tibial artery pulses are palpable. Common peroneal nerve function is well preserved. Patient's gait is cautious and antalgic. Skin and soft tissues are mildly swollen, consistent with synovitis and effusion. The patient has a significant limp with the first few steps after starting the gait cycle. Getting out of a chair takes a lot of effort due to pain on knee flexion.         Result Review :   The following data was reviewed by: Raleigh Pinon MD on 09/23/2021:  Radiologic studies - see below for interpretation  no diagnostic testing performed this visit            Procedures           Assessment   Assessment and Plan    Diagnoses and all orders for this visit:    1. Primary osteoarthritis of right knee (Primary)    2. Effusion of right knee    3. Primary osteoarthritis of left knee          Follow Up   · Compression/brace to  the knee to prevent it from buckling and giving out.  · Intra-articular steroid injection and viscosupplementation injections discussed with the patient at length.  · Calcium and vitamin D for bone health.  · There is no doubt in my mind that as his symptoms progress he is going to need knee replacement surgery.  The patient will let me know when he is ready for that form of intervention.  · Rest, ice, compression, and elevation (RICE) therapy  · Stretching and strengthening exercises of the quads and the hamstrings.  · Tablet meloxicam 7.5 mg tab 1 p.o. daily for pain swelling and discomfort.  · He will eventually need a total knee replacment based on symptom progression.  · OTC Alternate Ibuprofen and Tylenol as needed  · Follow up in 1 year(s)  • Patient was given instructions and counseling regarding his condition or for health maintenance advice. Please see specific information pulled into the AVS if appropriate.     Raleigh Pinon MD   Date of Encounter: 9/23/2021       EMR Dragon/Transcription disclaimer:  Much of this encounter note is an electronic transcription/translation of spoken language to printed text. The electronic translation of spoken language may permit erroneous, or at times, nonsensical words or phrases to be inadvertently transcribed; Although I have reviewed the note for such errors, some may still exist.

## 2021-09-29 PROBLEM — M17.12 PRIMARY OSTEOARTHRITIS OF LEFT KNEE: Status: ACTIVE | Noted: 2021-09-29

## 2021-10-06 ENCOUNTER — TELEPHONE (OUTPATIENT)
Dept: ORTHOPEDIC SURGERY | Facility: CLINIC | Age: 61
End: 2021-10-06

## 2021-10-06 DIAGNOSIS — E11.9 TYPE 2 DIABETES MELLITUS WITHOUT COMPLICATION, WITHOUT LONG-TERM CURRENT USE OF INSULIN (HCC): ICD-10-CM

## 2021-10-06 NOTE — TELEPHONE ENCOUNTER
I received a form from his insurance stating that he is taking xarelto and we are giving him mobic. Would you like him to stop the mobic?

## 2021-10-06 NOTE — TELEPHONE ENCOUNTER
I think he is doing well with the Mobic and other Xarelto.  As long as there is no direct contraindication in terms of him having hemorrhage or something like that.  Thank you

## 2021-10-06 NOTE — TELEPHONE ENCOUNTER
Caller: Nolan Browning    Relationship: Self      Medication requested (name and dosage): losartan (COZAAR) 25 MG tablet    Pharmacy where request should be sent: [facility] [address] [phone]  - [phone]   [phone]    Additional details provided by patient: THE PATIENT IS TRYING TO REFILL HIS MEDICATION AND THE PHARMACY IS STATING HE IS FILLING TO EARLY. HE IS OUT OF MEDICATION ENTIRELY. THIS PRESCRIPTION IS SUPPOSED TO BE A 90-DAY SUPPLY AND THE PHARMACY IS ONLY FILLING A 30. THE REST OF HIS MEDICATIONS REFILLED WITHOUT ISSUE. HE WOULD LIKE THE PHARMACY TO BE CONTACTED TO HAVE THIS CORRECTED. THE PATIENT WOULD LIKE A CALL BACK ONCE THIS HAS BEEN SENT    Best call back number: [phone]    Does the patient have less than a 3 day supply:  [x] Yes  [] No    Vicky Berry Rep   10/06/21 08:39 EDT

## 2021-10-07 RX ORDER — LOSARTAN POTASSIUM 25 MG/1
25 TABLET ORAL DAILY
Qty: 90 TABLET | Refills: 1 | Status: SHIPPED | OUTPATIENT
Start: 2021-10-07 | End: 2022-02-24 | Stop reason: SDUPTHER

## 2021-12-01 ENCOUNTER — TRANSCRIBE ORDERS (OUTPATIENT)
Dept: ADMINISTRATIVE | Facility: HOSPITAL | Age: 61
End: 2021-12-01

## 2021-12-01 DIAGNOSIS — I82.419 FEMORAL VEIN THROMBOSIS, UNSPECIFIED LATERALITY (HCC): Primary | ICD-10-CM

## 2021-12-02 ENCOUNTER — LAB (OUTPATIENT)
Dept: LAB | Facility: HOSPITAL | Age: 61
End: 2021-12-02

## 2021-12-02 DIAGNOSIS — I82.419 FEMORAL VEIN THROMBOSIS, UNSPECIFIED LATERALITY (HCC): ICD-10-CM

## 2021-12-02 DIAGNOSIS — M17.11 PRIMARY OSTEOARTHRITIS OF RIGHT KNEE: ICD-10-CM

## 2021-12-02 LAB
APTT PPP: 32 SECONDS (ref 22.2–34.2)
AT III PPP CHRO-ACNC: 110 % (ref 80–120)
CHROMATIN AB SERPL-ACNC: <10 IU/ML (ref 0–14)
ERYTHROCYTE [SEDIMENTATION RATE] IN BLOOD: 16 MM/HR (ref 0–20)
FIBRINOGEN PPP-MCNC: 331 MG/DL (ref 200–450)
HCYS SERPL-MCNC: 15 UMOL/L (ref 0–15)
INR PPP: 1.14 (ref 2–3)
PROTHROMBIN TIME: 11.7 SECONDS (ref 9.4–12)

## 2021-12-02 PROCEDURE — 85732 THROMBOPLASTIN TIME PARTIAL: CPT

## 2021-12-02 PROCEDURE — 86431 RHEUMATOID FACTOR QUANT: CPT

## 2021-12-02 PROCEDURE — 85670 THROMBIN TIME PLASMA: CPT

## 2021-12-02 PROCEDURE — 85705 THROMBOPLASTIN INHIBITION: CPT

## 2021-12-02 PROCEDURE — 85730 THROMBOPLASTIN TIME PARTIAL: CPT

## 2021-12-02 PROCEDURE — 85303 CLOT INHIBIT PROT C ACTIVITY: CPT

## 2021-12-02 PROCEDURE — 81241 F5 GENE: CPT

## 2021-12-02 PROCEDURE — 36415 COLL VENOUS BLD VENIPUNCTURE: CPT

## 2021-12-02 PROCEDURE — 86038 ANTINUCLEAR ANTIBODIES: CPT

## 2021-12-02 PROCEDURE — 86225 DNA ANTIBODY NATIVE: CPT

## 2021-12-02 PROCEDURE — 85613 RUSSELL VIPER VENOM DILUTED: CPT

## 2021-12-02 PROCEDURE — 81240 F2 GENE: CPT

## 2021-12-02 PROCEDURE — 83090 ASSAY OF HOMOCYSTEINE: CPT

## 2021-12-02 PROCEDURE — 85652 RBC SED RATE AUTOMATED: CPT

## 2021-12-02 PROCEDURE — 86148 ANTI-PHOSPHOLIPID ANTIBODY: CPT

## 2021-12-02 PROCEDURE — 86147 CARDIOLIPIN ANTIBODY EA IG: CPT

## 2021-12-02 PROCEDURE — 85384 FIBRINOGEN ACTIVITY: CPT

## 2021-12-02 PROCEDURE — 85610 PROTHROMBIN TIME: CPT

## 2021-12-02 PROCEDURE — 85300 ANTITHROMBIN III ACTIVITY: CPT

## 2021-12-02 PROCEDURE — 85306 CLOT INHIBIT PROT S FREE: CPT

## 2021-12-02 PROCEDURE — 86146 BETA-2 GLYCOPROTEIN ANTIBODY: CPT

## 2021-12-02 RX ORDER — MELOXICAM 7.5 MG/1
TABLET ORAL
Qty: 30 TABLET | Refills: 3 | Status: SHIPPED | OUTPATIENT
Start: 2021-12-02 | End: 2022-04-19

## 2021-12-03 LAB
DSDNA IGG SERPL IA-ACNC: NEGATIVE [IU]/ML
F5 GENE MUT ANL BLD/T: NORMAL
FACTOR II, DNA ANALYSIS: NORMAL
NUCLEAR IGG SER IA-RTO: NEGATIVE

## 2021-12-04 LAB
CARDIOLIPIN IGA SER IA-ACNC: <9 APL U/ML (ref 0–11)
CARDIOLIPIN IGG SER IA-ACNC: <9 GPL U/ML (ref 0–14)
CARDIOLIPIN IGM SER IA-ACNC: 23 MPL U/ML (ref 0–12)

## 2021-12-06 LAB
B2 GLYCOPROT1 IGA SER-ACNC: <9 GPI IGA UNITS (ref 0–25)
B2 GLYCOPROT1 IGG SER-ACNC: <9 GPI IGG UNITS (ref 0–20)
B2 GLYCOPROT1 IGM SER-ACNC: 11 GPI IGM UNITS (ref 0–32)
PROT C ACT/NOR PPP CHRO: 140 %

## 2021-12-07 LAB
APTT SCREEN TO CONFIRM RATIO: 0.67 RATIO (ref 0–1.34)
APTT-LA 1H NP PPP: 44.7 SEC (ref 0–48.9)
CONFIRM APTT/NORMAL: 60.4 SEC (ref 0–47.6)
DRVVT SCREEN TO CONFIRM RATIO: 1.7 RATIO (ref 0.8–1.2)
LA 2 SCREEN W REFLEX-IMP: ABNORMAL
MIXING APTT: 46.9 SEC (ref 0–48.9)
MIXING DRVVT: 61.3 SEC (ref 0–40.4)
PROT S ACT/NOR PPP: 134 % (ref 63–140)
SCREEN APTT: 55.6 SEC (ref 0–51.9)
SCREEN DRVVT: 91.6 SEC (ref 0–47)
THROMBIN TIME: 16.3 SEC (ref 0–23)

## 2021-12-09 LAB
PS IGA SER-ACNC: 2 APS UNITS (ref 0–19)
PS IGG SER-ACNC: <10 UNITS (ref 0–30)
PS IGM SER-ACNC: 30 MPS IGM

## 2022-01-17 NOTE — PROGRESS NOTES
REFERRING PROVIDER:    Tee Morales MD  [id number] [address]    REASON FOR CONSULTATION:    Unprovoked left lower extremity DVT    HISTORY OF PRESENT ILLNESS:  Nolan Browning is a 61 y.o. male who is referred today for further evaluation of unprovoked left lower extremity DVT.    He presented to Dr. Butler in [place] on 8/12/2021 with about a 1 week history of left calf pain and swelling.  At that time it was noted that he had symptoms consistent with a mild upper respiratory infection.  Also, in addition, he    Did have a golf ball injury to his left leg prior to developing a DVT.  He was not immobilized after that.  His only trip was to [facility].  He did have some left leg discomfort during that time.    Left lower extremity venous duplex on 8/12/2021 showed acute left lower extremity DVT in the deep femoral vein and in the popliteal vein.    He was started on Xarelto.  He tolerates this very well.  No bleeding issues.    He saw Dr. Dominic Morales with vascular surgery.  He has a left lower extremity compression stocking that he wears.  He continues to have some left lower extremity edema but otherwise his leg is doing well.    Follow-up left lower extremity venous duplex on 11/29/2021 showed subacute thrombosis in the distal femoral and popliteal veins with chronic and subacute thrombotic changes seen in the calf.    He had a laboratory thrombophilia evaluation performed on 12/2/2021.  Functional protein S was normal at 134%, protein C activity was 140%, Antithrombin activity was 110%, factor V Leiden negative, prothrombin gene mutation negative, lupus anticoagulant positive (on anticoagulation) anticardiolipin IgM mildly elevated at 23 with a normal IgG and normal IgA,, beta-2 glycoprotein antibodies normal, antiphosphatidylserine IgM mildly elevated at 30, homocystine normal at 15.0.        At this point he is doing well.  He denies any bleeding.  He states he had  a colonoscopy about 5 years ago.  He had a PSA done in August 2021 that was normal.  No family history of venous thromboembolism.  No family history of malignancy.    Past Medical History:   Diagnosis Date   • Diverticulosis of large intestine without perforation or abscess without bleeding    • DVT (deep venous thrombosis) (HCC)    • Gout, unspecified    • High cholesterol    • Personal history of colonic polyps    • Type 2 diabetes mellitus without complications (HCC)        Past Surgical History:   Procedure Laterality Date   • KNEE CARTILAGE SURGERY Right 01/25/2005    knee arthroscopy    • WISDOM TOOTH EXTRACTION         SOCIAL HISTORY:   reports that he has never smoked. He has never used smokeless tobacco. He reports current alcohol use. He reports that he does not use drugs.    FAMILY HISTORY:  family history includes Dementia in his father; Diabetes in his brother; Heart attack in his mother; Heart disease in his mother; Stroke in his father.    ALLERGIES:  Allergies   Allergen Reactions   • Lisinopril Cough       MEDICATIONS:  The medication list has been reviewed with the patient by the medical assistant, and the list has been updated in the electronic medical record, which I reviewed.  Medication dosages and frequencies were confirmed to be accurate.    Review of Systems   Constitutional: Negative for appetite change, chills, fatigue, fever and unexpected weight change.   HENT: Negative for congestion, dental problem, ear pain, hearing loss, mouth sores, nosebleeds, postnasal drip, rhinorrhea, tinnitus and trouble swallowing.    Eyes: Negative.    Respiratory: Negative for cough, chest tightness, shortness of breath, wheezing and stridor.    Cardiovascular: Negative for chest pain, palpitations and leg swelling.   Gastrointestinal: Negative for abdominal distention, abdominal pain, blood in stool, constipation, diarrhea, nausea and vomiting.   Endocrine: Negative.    Genitourinary: Negative for  "decreased urine volume, difficulty urinating, dysuria, flank pain, frequency, hematuria, scrotal swelling, testicular pain and urgency.   Musculoskeletal: Negative for arthralgias, back pain and joint swelling.   Allergic/Immunologic: Negative.    Neurological: Negative for dizziness, seizures, syncope, weakness, light-headedness, numbness and headaches.   Hematological: Negative for adenopathy. Does not bruise/bleed easily.   Psychiatric/Behavioral: Negative for confusion and sleep disturbance. The patient is not nervous/anxious.    All other systems reviewed and are negative.      Vitals:    01/19/22 1012   BP: 135/90   Pulse: 71   Resp: 17   Temp: 97.2 °F (36.2 °C)   TempSrc: Temporal   SpO2: 96%   Weight: 123 kg (270 lb 6.4 oz)   Height: 185.4 cm (72.99\")   PainSc: 0-No pain       Physical Exam  Vitals and nursing note reviewed.   Constitutional:       General: He is not in acute distress.     Appearance: He is well-developed.   HENT:      Head: Normocephalic and atraumatic. Hair is normal.   Eyes:      General: Lids are normal.      Conjunctiva/sclera: Conjunctivae normal.      Pupils: Pupils are equal.   Neck:      Thyroid: No thyroid mass or thyromegaly.      Vascular: No JVD.   Cardiovascular:      Rate and Rhythm: Normal rate and regular rhythm.      Heart sounds: No murmur heard.  No friction rub. No gallop.    Pulmonary:      Effort: Pulmonary effort is normal. No respiratory distress.      Breath sounds: Normal breath sounds. No wheezing or rales.   Chest:   Breasts:      Right: No supraclavicular adenopathy.      Left: No supraclavicular adenopathy.       Abdominal:      General: There is no distension.      Palpations: Abdomen is soft. There is no hepatomegaly, splenomegaly or mass.      Tenderness: There is no abdominal tenderness. There is no guarding.   Musculoskeletal:         General: No tenderness or deformity. Normal range of motion.      Cervical back: Neck supple.      Left lower leg: Edema " (Trace) present.   Lymphadenopathy:      Cervical: No cervical adenopathy.      Upper Body:      Right upper body: No supraclavicular adenopathy.      Left upper body: No supraclavicular adenopathy.   Skin:     General: Skin is warm and dry.      Findings: No rash.   Neurological:      Mental Status: He is alert and oriented to person, place, and time.   Psychiatric:         Behavior: Behavior normal.         Thought Content: Thought content normal.         Judgment: Judgment normal.         DIAGNOSTIC DATA:  CBC & Differential (01/19/2022 10:07)      IMAGING:    None reviewed    ASSESSMENT:  This is a 61 y.o. male with:    *History of unprovoked left lower extremity DVT in the distal femoral and popliteal veins  • Initially diagnosed 8/12/2021  • He had about a 1 week history of left lower extremity calf pain and swelling with some mild paresthesias prior to diagnosis.  Mild upper respiratory symptoms at that time.  Only travel was in June to [facility].  • Repeat left lower extremity DVT on 11/29/2021 with subacute thrombotic changes in the distal femoral and popliteal veins.  Chronic and subacute changes in the calf.  • He had a laboratory thrombophilia evaluation performed on 12/2/2021.  Functional protein S was normal at 134%, protein C activity was 140%, Antithrombin activity was 110%, factor V Leiden negative, prothrombin gene mutation negative, lupus anticoagulant positive (on anticoagulation) anticardiolipin IgM mildly elevated at 23 with a normal IgG and normal IgA,, beta-2 glycoprotein antibodies normal, antiphosphatidylserine IgM mildly elevated at 30, homocystine normal at 15.0.  • Possibility of a false positive lupus anticoagulant test given the fact that he was on anticoagulation at the time.  Supporting this was the fact that his baseline PTT was normal.  However, he does have a couple of mildly elevated antiphospholipid antibodies.    PLAN:   1. Follow-up with Dr. Morales as scheduled on  1/31.  I presume there will be repeat ultrasound imaging at that time.    2. For now, continue Xarelto 20 mg daily  3. I would like to repeat a lupus anticoagulant test as well as repeat the antibody testing in early March after he has been off of Xarelto for several days prior to that.  He will then resume the Xarelto and see me back about a week after his labs have been performed.  4. At that time we will discuss options for ongoing anticoagulation if appropriate.  It is possible that we will be able to discontinue anticoagulation at that time.  The other option would be to continue Xarelto at a prophylactic dose of 10 mg daily with plans for indefinite anticoagulation.  5. I do not think he needs further screening for malignancy since his PSA was normal and he had a colonoscopy 5 years ago.  He is asymptomatic otherwise.    ORDERS PLACED DURING THIS ENCOUNTER  Orders Placed This Encounter   Procedures   • Lupus Anticoagulant Reflex     Standing Status:   Future     Standing Expiration Date:   1/19/2023     Scheduling Instructions:       ulrich     Order Specific Question:   Release to patient     Answer:   Immediate   • Anticardiolipin Antibody, IgG / M, Qn     Standing Status:   Future     Standing Expiration Date:   1/19/2023     Scheduling Instructions:       ulrich     Order Specific Question:   Release to patient     Answer:   Immediate   • Phosphatidylserine Antibodies     Standing Status:   Future     Standing Expiration Date:   1/19/2023     Scheduling Instructions:       ulrich     Order Specific Question:   Release to patient     Answer:   Immediate   • CBC & Differential     Standing Status:   Future     Number of Occurrences:   1     Standing Expiration Date:   1/19/2023     Order Specific Question:   Manual Differential     Answer:   No     Order Specific Question:   Release to patient     Answer:   Immediate   • CBC & Differential     Standing Status:   Future     Standing Expiration Date:    1/19/2023     Scheduling Instructions:      blanca ulrich     Order Specific Question:   Manual Differential     Answer:   No

## 2022-01-19 ENCOUNTER — CONSULT (OUTPATIENT)
Dept: ONCOLOGY | Facility: CLINIC | Age: 62
End: 2022-01-19

## 2022-01-19 ENCOUNTER — LAB (OUTPATIENT)
Dept: LAB | Facility: HOSPITAL | Age: 62
End: 2022-01-19

## 2022-01-19 VITALS
RESPIRATION RATE: 17 BRPM | HEART RATE: 71 BPM | BODY MASS INDEX: 35.84 KG/M2 | OXYGEN SATURATION: 96 % | SYSTOLIC BLOOD PRESSURE: 135 MMHG | DIASTOLIC BLOOD PRESSURE: 90 MMHG | HEIGHT: 73 IN | WEIGHT: 270.4 LBS | TEMPERATURE: 97.2 F

## 2022-01-19 DIAGNOSIS — I82.412 ACUTE DEEP VEIN THROMBOSIS (DVT) OF FEMORAL VEIN OF LEFT LOWER EXTREMITY: Primary | ICD-10-CM

## 2022-01-19 DIAGNOSIS — I82.412 ACUTE DEEP VEIN THROMBOSIS (DVT) OF FEMORAL VEIN OF LEFT LOWER EXTREMITY: ICD-10-CM

## 2022-01-19 LAB
BASOPHILS # BLD AUTO: 0.08 10*3/MM3 (ref 0–0.2)
BASOPHILS NFR BLD AUTO: 1 % (ref 0–1.5)
DEPRECATED RDW RBC AUTO: 42.3 FL (ref 37–54)
EOSINOPHIL # BLD AUTO: 0.23 10*3/MM3 (ref 0–0.4)
EOSINOPHIL NFR BLD AUTO: 2.9 % (ref 0.3–6.2)
ERYTHROCYTE [DISTWIDTH] IN BLOOD BY AUTOMATED COUNT: 13.2 % (ref 12.3–15.4)
HCT VFR BLD AUTO: 44.1 % (ref 37.5–51)
HGB BLD-MCNC: 14.9 G/DL (ref 13–17.7)
IMM GRANULOCYTES # BLD AUTO: 0.04 10*3/MM3 (ref 0–0.05)
IMM GRANULOCYTES NFR BLD AUTO: 0.5 % (ref 0–0.5)
LYMPHOCYTES # BLD AUTO: 1.45 10*3/MM3 (ref 0.7–3.1)
LYMPHOCYTES NFR BLD AUTO: 18.5 % (ref 19.6–45.3)
MCH RBC QN AUTO: 29.8 PG (ref 26.6–33)
MCHC RBC AUTO-ENTMCNC: 33.8 G/DL (ref 31.5–35.7)
MCV RBC AUTO: 88.2 FL (ref 79–97)
MONOCYTES # BLD AUTO: 0.68 10*3/MM3 (ref 0.1–0.9)
MONOCYTES NFR BLD AUTO: 8.7 % (ref 5–12)
NEUTROPHILS NFR BLD AUTO: 5.36 10*3/MM3 (ref 1.7–7)
NEUTROPHILS NFR BLD AUTO: 68.4 % (ref 42.7–76)
NRBC BLD AUTO-RTO: 0 /100 WBC (ref 0–0.2)
PLATELET # BLD AUTO: 212 10*3/MM3 (ref 140–450)
PMV BLD AUTO: 9.8 FL (ref 6–12)
RBC # BLD AUTO: 5 10*6/MM3 (ref 4.14–5.8)
WBC NRBC COR # BLD: 7.84 10*3/MM3 (ref 3.4–10.8)

## 2022-01-19 PROCEDURE — 36415 COLL VENOUS BLD VENIPUNCTURE: CPT

## 2022-01-19 PROCEDURE — 85025 COMPLETE CBC W/AUTO DIFF WBC: CPT

## 2022-01-19 PROCEDURE — 99204 OFFICE O/P NEW MOD 45 MIN: CPT | Performed by: INTERNAL MEDICINE

## 2022-02-23 ENCOUNTER — LAB (OUTPATIENT)
Dept: LAB | Facility: HOSPITAL | Age: 62
End: 2022-02-23

## 2022-02-23 DIAGNOSIS — I82.412 ACUTE DEEP VEIN THROMBOSIS (DVT) OF FEMORAL VEIN OF LEFT LOWER EXTREMITY: ICD-10-CM

## 2022-02-23 LAB
BASOPHILS # BLD AUTO: 0.05 10*3/MM3 (ref 0–0.2)
BASOPHILS NFR BLD AUTO: 0.7 % (ref 0–1.5)
DEPRECATED RDW RBC AUTO: 46.3 FL (ref 37–54)
EOSINOPHIL # BLD AUTO: 0.27 10*3/MM3 (ref 0–0.4)
EOSINOPHIL NFR BLD AUTO: 3.6 % (ref 0.3–6.2)
ERYTHROCYTE [DISTWIDTH] IN BLOOD BY AUTOMATED COUNT: 13.5 % (ref 12.3–15.4)
HCT VFR BLD AUTO: 45.5 % (ref 37.5–51)
HGB BLD-MCNC: 14.7 G/DL (ref 13–17.7)
IMM GRANULOCYTES # BLD AUTO: 0.03 10*3/MM3 (ref 0–0.05)
IMM GRANULOCYTES NFR BLD AUTO: 0.4 % (ref 0–0.5)
LYMPHOCYTES # BLD AUTO: 1.37 10*3/MM3 (ref 0.7–3.1)
LYMPHOCYTES NFR BLD AUTO: 18.2 % (ref 19.6–45.3)
MCH RBC QN AUTO: 30.1 PG (ref 26.6–33)
MCHC RBC AUTO-ENTMCNC: 32.3 G/DL (ref 31.5–35.7)
MCV RBC AUTO: 93.2 FL (ref 79–97)
MONOCYTES # BLD AUTO: 0.58 10*3/MM3 (ref 0.1–0.9)
MONOCYTES NFR BLD AUTO: 7.7 % (ref 5–12)
NEUTROPHILS NFR BLD AUTO: 5.23 10*3/MM3 (ref 1.7–7)
NEUTROPHILS NFR BLD AUTO: 69.4 % (ref 42.7–76)
PLATELET # BLD AUTO: 206 10*3/MM3 (ref 140–450)
PMV BLD AUTO: 10.3 FL (ref 6–12)
RBC # BLD AUTO: 4.88 10*6/MM3 (ref 4.14–5.8)
WBC NRBC COR # BLD: 7.53 10*3/MM3 (ref 3.4–10.8)

## 2022-02-23 PROCEDURE — 85613 RUSSELL VIPER VENOM DILUTED: CPT

## 2022-02-23 PROCEDURE — 86148 ANTI-PHOSPHOLIPID ANTIBODY: CPT

## 2022-02-23 PROCEDURE — 36415 COLL VENOUS BLD VENIPUNCTURE: CPT

## 2022-02-23 PROCEDURE — 85025 COMPLETE CBC W/AUTO DIFF WBC: CPT

## 2022-02-23 PROCEDURE — 86147 CARDIOLIPIN ANTIBODY EA IG: CPT

## 2022-02-23 PROCEDURE — 85732 THROMBOPLASTIN TIME PARTIAL: CPT

## 2022-02-24 ENCOUNTER — TELEPHONE (OUTPATIENT)
Dept: FAMILY MEDICINE CLINIC | Age: 62
End: 2022-02-24

## 2022-02-24 ENCOUNTER — OFFICE VISIT (OUTPATIENT)
Dept: FAMILY MEDICINE CLINIC | Age: 62
End: 2022-02-24

## 2022-02-24 VITALS
TEMPERATURE: 97.8 F | OXYGEN SATURATION: 96 % | BODY MASS INDEX: 35.52 KG/M2 | WEIGHT: 268 LBS | HEART RATE: 79 BPM | DIASTOLIC BLOOD PRESSURE: 93 MMHG | SYSTOLIC BLOOD PRESSURE: 124 MMHG | HEIGHT: 73 IN

## 2022-02-24 DIAGNOSIS — I82.412 ACUTE DEEP VEIN THROMBOSIS (DVT) OF FEMORAL VEIN OF LEFT LOWER EXTREMITY: Primary | ICD-10-CM

## 2022-02-24 DIAGNOSIS — E11.9 TYPE 2 DIABETES MELLITUS WITHOUT COMPLICATION, WITHOUT LONG-TERM CURRENT USE OF INSULIN: ICD-10-CM

## 2022-02-24 DIAGNOSIS — Z86.010 PERSONAL HISTORY OF COLONIC POLYPS: ICD-10-CM

## 2022-02-24 LAB
CARDIOLIPIN IGG SER IA-ACNC: <9 GPL U/ML (ref 0–14)
CARDIOLIPIN IGM SER IA-ACNC: 18 MPL U/ML (ref 0–12)
LA 2 SCREEN W REFLEX-IMP: NORMAL
SCREEN APTT: 35.5 SEC (ref 0–51.9)
SCREEN DRVVT: 36.8 SEC (ref 0–47)

## 2022-02-24 PROCEDURE — 99214 OFFICE O/P EST MOD 30 MIN: CPT | Performed by: NURSE PRACTITIONER

## 2022-02-24 RX ORDER — VIT C/B6/B5/MAGNESIUM/HERB 173 50-5-6-5MG
CAPSULE ORAL DAILY
COMMUNITY

## 2022-02-24 RX ORDER — LOSARTAN POTASSIUM 25 MG/1
25 TABLET ORAL DAILY
Qty: 90 TABLET | Refills: 1 | Status: SHIPPED | OUTPATIENT
Start: 2022-02-24 | End: 2022-09-08 | Stop reason: SDUPTHER

## 2022-02-24 RX ORDER — PRAVASTATIN SODIUM 10 MG
10 TABLET ORAL DAILY
Qty: 90 TABLET | Refills: 1 | Status: SHIPPED | OUTPATIENT
Start: 2022-02-24 | End: 2022-09-08 | Stop reason: SDUPTHER

## 2022-02-24 NOTE — PROGRESS NOTES
Chief Complaint  Nolan Browning presents to [facility] for Diabetes (7MO)    Subjective          History of Present Illness     Haja is following up on type 2 diabetes.  Current meds include an oral hypoglycemic ( Glucophage 500 mg BID) and a statin (Pravastatin 10 mg daily) and an ARB (Losartan 25 mg daily).  Most recent lab results include Hemoglobin A1c:  6.84 (%) Reports UTD eye exam 10/2021 with Dr Hanson.  He remains on Xarelto for DVT diagnosed 8/2021. Has seen vascular and hematology. Has follow up scheduled.   He had colonoscopy 7/17/2019 with polyps. Advised repeat 5 years.       Review of Systems      Allergies   Allergen Reactions   • Lisinopril Cough      Past Medical History:   Diagnosis Date   • Diverticulosis of large intestine without perforation or abscess without bleeding    • DVT (deep venous thrombosis) (HCC)    • Gout, unspecified    • High cholesterol    • Personal history of colonic polyps    • Type 2 diabetes mellitus without complications (HCC)      Current Outpatient Medications   Medication Sig Dispense Refill   • Accu-Chek Maria A Plus test strip CHECK BLOOD SUGAR ONE TO TWO TIMES DAILY AS DIRECTED 100 each 1   • losartan (COZAAR) 25 MG tablet Take 1 tablet by mouth Daily. 90 tablet 1   • meloxicam (MOBIC) 7.5 MG tablet TAKE 1 TABLET BY MOUTH EVERY DAY WITH FOOD 30 tablet 3   • metFORMIN (GLUCOPHAGE) 500 MG tablet Take 1 tablet by mouth 2 (Two) Times a Day. 180 tablet 1   • Misc Natural Products (Osteo Bi-Flex Adv Triple St) tablet Take  by mouth.     • pravastatin (PRAVACHOL) 10 MG tablet Take 1 tablet by mouth Daily. 90 tablet 1   • rivaroxaban (Xarelto) 20 MG tablet Take 1 tablet by mouth Daily. 90 tablet 1   • Turmeric 500 MG capsule Take  by mouth.       No current facility-administered medications for this visit.     Past Surgical History:   Procedure Laterality Date   • KNEE CARTILAGE SURGERY Right 01/25/2005    knee arthroscopy    •  "WISDOM TOOTH EXTRACTION        Social History     Tobacco Use   • Smoking status: Never Smoker   • Smokeless tobacco: Never Used   Vaping Use   • Vaping Use: Never used   Substance Use Topics   • Alcohol use: Yes   • Drug use: Never     Family History   Problem Relation Age of Onset   • Heart disease Mother    • Heart attack Mother         CAUSE OF DEATH   • Dementia Father    • Stroke Father    • Diabetes Brother      Health Maintenance Due   Topic Date Due   • DIABETIC EYE EXAM  10/26/2021   • DIABETIC FOOT EXAM  02/19/2022   • LIPID PANEL  02/22/2022   • URINE MICROALBUMIN  02/22/2022      Immunization History   Administered Date(s) Administered   • COVID-19 (MODERNA) 1st, 2nd, 3rd Dose Only 03/04/2021, 04/10/2021, 11/22/2021   • Influenza, Unspecified 02/14/2020   • Tdap 08/19/2020        Objective     Vitals:    02/24/22 1028   BP: 124/93   Pulse: 79   Temp: 97.8 °F (36.6 °C)   SpO2: 96%   Weight: 122 kg (268 lb)   Height: 185.4 cm (73\")     Body mass index is 35.36 kg/m².     Physical Exam  Vitals reviewed.   Constitutional:       General: He is not in acute distress.     Appearance: Normal appearance. He is well-developed.   HENT:      Head: Normocephalic and atraumatic.   Cardiovascular:      Rate and Rhythm: Normal rate and regular rhythm.   Pulmonary:      Effort: Pulmonary effort is normal.      Breath sounds: Normal breath sounds.   Neurological:      Mental Status: He is alert and oriented to person, place, and time.   Psychiatric:         Mood and Affect: Mood and affect normal.           Result Review :     The following data was reviewed by: RAYMON Lopez on 02/24/2022:          Comprehensive Metabolic Panel (08/26/2021 10:03)  CBC Auto Differential (08/26/2021 10:03)  Hepatitis C Antibody (08/26/2021 10:03)  Hemoglobin A1c (08/26/2021 10:03)  PSA Screen (08/26/2021 10:03)                  Assessment and Plan      Diagnoses and all orders for this visit:    1. Acute deep vein thrombosis (DVT) " of femoral vein of left lower extremity (HCC) (Primary)  Comments:  Continue Xarelto for now.  Awaiting recommendations from hematology.    2. Type 2 diabetes mellitus without complication, without long-term current use of insulin (HCC)  Comments:  Medical condition is stable.  Continue same therapy.  Will recheck at next regular appointment.  Orders:  -     metFORMIN (GLUCOPHAGE) 500 MG tablet; Take 1 tablet by mouth 2 (Two) Times a Day.  Dispense: 180 tablet; Refill: 1  -     pravastatin (PRAVACHOL) 10 MG tablet; Take 1 tablet by mouth Daily.  Dispense: 90 tablet; Refill: 1  -     losartan (COZAAR) 25 MG tablet; Take 1 tablet by mouth Daily.  Dispense: 90 tablet; Refill: 1  -     Comprehensive metabolic panel; Future  -     Hemoglobin A1c; Future  -     Lipid panel; Future  -     Microalbumin / Creatinine Urine Ratio - Urine, Clean Catch; Future    3. Personal history of colonic polyps  Comments:  Will be due for repeat screening colonoscopy in 2024.              Follow Up     Return in about 6 months (around 8/24/2022).

## 2022-03-01 ENCOUNTER — LAB (OUTPATIENT)
Dept: LAB | Facility: HOSPITAL | Age: 62
End: 2022-03-01

## 2022-03-01 DIAGNOSIS — E11.9 TYPE 2 DIABETES MELLITUS WITHOUT COMPLICATION, WITHOUT LONG-TERM CURRENT USE OF INSULIN: ICD-10-CM

## 2022-03-01 LAB
ALBUMIN SERPL-MCNC: 4.1 G/DL (ref 3.5–5.2)
ALBUMIN UR-MCNC: <1.2 MG/DL
ALBUMIN/GLOB SERPL: 1.4 G/DL
ALP SERPL-CCNC: 77 U/L (ref 39–117)
ALT SERPL W P-5'-P-CCNC: 22 U/L (ref 1–41)
ANION GAP SERPL CALCULATED.3IONS-SCNC: 10.4 MMOL/L (ref 5–15)
AST SERPL-CCNC: 19 U/L (ref 1–40)
BILIRUB SERPL-MCNC: 0.7 MG/DL (ref 0–1.2)
BUN SERPL-MCNC: 11 MG/DL (ref 8–23)
BUN/CREAT SERPL: 9.3 (ref 7–25)
CALCIUM SPEC-SCNC: 8.9 MG/DL (ref 8.6–10.5)
CHLORIDE SERPL-SCNC: 106 MMOL/L (ref 98–107)
CHOLEST SERPL-MCNC: 119 MG/DL (ref 0–200)
CO2 SERPL-SCNC: 23.6 MMOL/L (ref 22–29)
CREAT SERPL-MCNC: 1.18 MG/DL (ref 0.76–1.27)
CREAT UR-MCNC: 250.3 MG/DL
EGFRCR SERPLBLD CKD-EPI 2021: 70.2 ML/MIN/1.73
GLOBULIN UR ELPH-MCNC: 3 GM/DL
GLUCOSE SERPL-MCNC: 108 MG/DL (ref 65–99)
HBA1C MFR BLD: 6.8 % (ref 4.8–5.6)
HDLC SERPL-MCNC: 38 MG/DL (ref 40–60)
LDLC SERPL CALC-MCNC: 62 MG/DL (ref 0–100)
LDLC/HDLC SERPL: 1.61 {RATIO}
MICROALBUMIN/CREAT UR: NORMAL MG/G{CREAT}
POTASSIUM SERPL-SCNC: 4.1 MMOL/L (ref 3.5–5.2)
PROT SERPL-MCNC: 7.1 G/DL (ref 6–8.5)
PS IGA SER-ACNC: <1 APS UNITS (ref 0–19)
PS IGG SER-ACNC: 9 UNITS (ref 0–30)
PS IGM SER-ACNC: <10 UNITS (ref 0–30)
SODIUM SERPL-SCNC: 140 MMOL/L (ref 136–145)
TRIGL SERPL-MCNC: 100 MG/DL (ref 0–150)
VLDLC SERPL-MCNC: 19 MG/DL (ref 5–40)

## 2022-03-01 PROCEDURE — 36415 COLL VENOUS BLD VENIPUNCTURE: CPT

## 2022-03-01 PROCEDURE — 80053 COMPREHEN METABOLIC PANEL: CPT

## 2022-03-01 PROCEDURE — 80061 LIPID PANEL: CPT

## 2022-03-01 PROCEDURE — 82570 ASSAY OF URINE CREATININE: CPT

## 2022-03-01 PROCEDURE — 83036 HEMOGLOBIN GLYCOSYLATED A1C: CPT

## 2022-03-01 PROCEDURE — 82043 UR ALBUMIN QUANTITATIVE: CPT

## 2022-03-02 ENCOUNTER — APPOINTMENT (OUTPATIENT)
Dept: LAB | Facility: HOSPITAL | Age: 62
End: 2022-03-02

## 2022-03-02 ENCOUNTER — OFFICE VISIT (OUTPATIENT)
Dept: ONCOLOGY | Facility: CLINIC | Age: 62
End: 2022-03-02

## 2022-03-02 VITALS
SYSTOLIC BLOOD PRESSURE: 126 MMHG | WEIGHT: 268.8 LBS | BODY MASS INDEX: 35.63 KG/M2 | DIASTOLIC BLOOD PRESSURE: 89 MMHG | OXYGEN SATURATION: 97 % | HEART RATE: 86 BPM | RESPIRATION RATE: 16 BRPM | TEMPERATURE: 97.8 F | HEIGHT: 73 IN

## 2022-03-02 DIAGNOSIS — I82.412 ACUTE DEEP VEIN THROMBOSIS (DVT) OF FEMORAL VEIN OF LEFT LOWER EXTREMITY: Primary | ICD-10-CM

## 2022-03-02 PROCEDURE — 99213 OFFICE O/P EST LOW 20 MIN: CPT | Performed by: INTERNAL MEDICINE

## 2022-03-02 NOTE — PROGRESS NOTES
"[facility] OUTPATIENT FOLLOW UP CLINIC VISIT    REASON FOR FOLLOW-UP:    Unprovoked left lower extremity DVT    HISTORY OF PRESENT ILLNESS:  Nolan Browning is a 62 y.o. male who returns today for follow up of the above issue.      He stopped the Xarelto as we discussed to get his labs done but has resumed it.  He has done well on the Xarelto without any bleeding.  He continues to wear bilateral lower extremity compression stockings.  He does have a trip to [place] planned in the near future.    REVIEW OF SYSTEMS:  As per the HPI    PHYSICAL EXAMINATION:    Vitals:    03/02/22 0924   BP: 126/89   Pulse: 86   Resp: 16   Temp: 97.8 °F (36.6 °C)   TempSrc: Temporal   SpO2: 97%   Weight: 122 kg (268 lb 12.8 oz)   Height: 185.4 cm (72.99\")   PainSc: 0-No pain       General:  No acute distress, awake, alert and oriented  Skin:  Warm and dry, no visible rash  HEENT:  Normocephalic/atraumatic.  Wearing a face mask.  Chest:  Normal respiratory effort  Extremities:  No visible clubbing, cyanosis, or edema  Neuro/psych:  Grossly nonfocal.  Normal mood and affect.        DIAGNOSTIC DATA:  Lupus Anticoagulant Reflex (02/23/2022 07:19)  Anticardiolipin Antibody, IgG / M, Qn (02/23/2022 07:19)  Phosphatidylserine Antibodies (02/23/2022 07:19)      IMAGING:    None reviewed    ASSESSMENT:  This is a 62 y.o. male with:  *History of unprovoked left lower extremity DVT in the distal femoral and popliteal veins  · Initially diagnosed 8/12/2021  · He had about a 1 week history of left lower extremity calf pain and swelling with some mild paresthesias prior to diagnosis.  Mild upper respiratory symptoms at that time.  Only travel was in June to [facility].  · Repeat left lower extremity DVT on 11/29/2021 with subacute thrombotic changes in the distal femoral and popliteal veins.  Chronic and subacute changes in the calf.  · He had a laboratory thrombophilia evaluation performed on 12/2/2021.  Functional protein S " was normal at 134%, protein C activity was 140%, Antithrombin activity was 110%, factor V Leiden negative, prothrombin gene mutation negative, lupus anticoagulant positive (on anticoagulation) anticardiolipin IgM mildly elevated at 23 with a normal IgG and normal IgA,, beta-2 glycoprotein antibodies normal, antiphosphatidylserine IgM mildly elevated at 30, homocystine normal at 15.0.  · Possibility of a false positive lupus anticoagulant test given the fact that he was on anticoagulation at the time.  Supporting this was the fact that his baseline PTT was normal.  However, he does have a couple of mildly elevated antiphospholipid antibodies.  · Follow up labs performed 2/23/22: lupus anticoagulant negative, anticardiolipin IgM still mildly elevated but better at 18, down from 23, phosphatidylserine IgM now <10, previously 30    PLAN:  1. He may discontinue Xarelto when he finishes his current prescription.  2. When he travels he will wear his compression stockings and make sure he moves his legs and ambulates on the plane when possible.  3. Follow-up here as needed

## 2022-03-06 DIAGNOSIS — I82.412 ACUTE DEEP VEIN THROMBOSIS (DVT) OF FEMORAL VEIN OF LEFT LOWER EXTREMITY: ICD-10-CM

## 2022-03-07 RX ORDER — RIVAROXABAN 20 MG/1
TABLET, FILM COATED ORAL
Qty: 90 TABLET | Refills: 1 | OUTPATIENT
Start: 2022-03-07

## 2022-03-10 ENCOUNTER — CLINICAL SUPPORT (OUTPATIENT)
Dept: ORTHOPEDIC SURGERY | Facility: CLINIC | Age: 62
End: 2022-03-10

## 2022-03-10 VITALS — BODY MASS INDEX: 35.52 KG/M2 | TEMPERATURE: 98 F | HEIGHT: 73 IN | WEIGHT: 268 LBS

## 2022-03-10 DIAGNOSIS — M17.11 PRIMARY OSTEOARTHRITIS OF RIGHT KNEE: Primary | ICD-10-CM

## 2022-03-10 PROCEDURE — 20610 DRAIN/INJ JOINT/BURSA W/O US: CPT | Performed by: ORTHOPAEDIC SURGERY

## 2022-03-10 RX ORDER — LIDOCAINE HYDROCHLORIDE 10 MG/ML
2 INJECTION, SOLUTION EPIDURAL; INFILTRATION; INTRACAUDAL; PERINEURAL
Status: COMPLETED | OUTPATIENT
Start: 2022-03-10 | End: 2022-03-10

## 2022-03-10 RX ORDER — METHYLPREDNISOLONE ACETATE 80 MG/ML
160 INJECTION, SUSPENSION INTRA-ARTICULAR; INTRALESIONAL; INTRAMUSCULAR; SOFT TISSUE
Status: COMPLETED | OUTPATIENT
Start: 2022-03-10 | End: 2022-03-10

## 2022-03-10 RX ADMIN — LIDOCAINE HYDROCHLORIDE 2 ML: 10 INJECTION, SOLUTION EPIDURAL; INFILTRATION; INTRACAUDAL; PERINEURAL at 09:45

## 2022-03-10 RX ADMIN — METHYLPREDNISOLONE ACETATE 160 MG: 80 INJECTION, SUSPENSION INTRA-ARTICULAR; INTRALESIONAL; INTRAMUSCULAR; SOFT TISSUE at 09:45

## 2022-03-10 NOTE — PROGRESS NOTES
"Chief Complaint  Follow-up of the Right Knee and Injections    Subjective    History of Present Illness      Nolan Browning is a 62 y.o. male who presents to [facility] for Patient returns today for right knee pain.  His pain is located over the medial and lateral aspect of the joint.  The pain has been progressive in nature and remains intermittent .  His pain is worsened by going up and down stairs, rising after sitting, walking, pivoting, golfing. There has been improvement in the past with injections.       Objective   Vital Signs:   Temp 98 °F (36.7 °C)   Ht 185.4 cm (72.99\")   Wt 122 kg (268 lb)   BMI 35.37 kg/m²     Physical Exam  62 y.o. male is awake, alert, oriented, in no acute distress and well developed, well nourished.  Ortho Exam   RIGHT knee  Right knee (varus). Patient has crepitus throughout range of motion. Positive patellar grind test. Mild effusion. Lachman is negative. Pivot shift is negative. Anterior and posterior drawer signs are negative. Significant joint line tenderness is noted on the medial aspect of the knee. Patient has a varus orientation of the knee. There is fullness and tenderness in the Popliteal fossa. Mild distention of a Popliteal cyst is noted in this location. Range of motion in flexion is from 0-110 degrees. Neurovascular status is intact.  Dorsalis pedis and posterior tibial artery pulses are palpable. Common peroneal nerve function is well preserved. Patient's gait is cautious and antalgic. Skin and soft tissues are mildly swollen, consistent with synovitis and effusion. The patient has a significant limp with the first few steps after starting the gait cycle. Getting out of a chair takes a lot of effort due to pain on knee flexion.       Result Review :                  Large Joint Arthrocentesis: R knee  Date/Time: 3/10/2022 9:45 AM  Consent given by: patient  Site marked: site marked  Timeout: Immediately prior to procedure a time " out was called to verify the correct patient, procedure, equipment, support staff and site/side marked as required   Supporting Documentation  Indications: pain and joint swelling   Procedure Details  Location: knee - R knee  Preparation: Patient was prepped and draped in the usual sterile fashion  Needle size: 25 G  Approach: anteromedial  Medications administered: 160 mg methylPREDNISolone acetate 80 MG/ML; 2 mL lidocaine PF 1% 1 %  Patient tolerance: patient tolerated the procedure well with no immediate complications               Assessment   Assessment and Plan    Problem List Items Addressed This Visit        Musculoskeletal and Injuries    Primary osteoarthritis of right knee - Primary    Relevant Orders    Large Joint Arthrocentesis: R knee          Follow Up   · Injected patient's right knee joint(s)with Depo-Medrol from an anteromedial approach   · Compression/brace   · Rest, ice, compression, and elevation (RICE) therapy  · OTC Alternate Ibuprofen and Tylenol as needed  · Follow up in 3 month(s)  • Patient was given instructions and counseling regarding his condition or for health maintenance advice. Please see specific information pulled into the AVS if appropriate.     Raleigh Pinon MD   Date of Encounter: 3/10/2022     EMR Dragon/Transcription disclaimer:  Much of this encounter note is an electronic transcription/translation of spoken language to printed text. The electronic translation of spoken language may permit erroneous, or at times, nonsensical words or phrases to be inadvertently transcribed; Although I have reviewed the note for such errors, some may still exist.

## 2022-04-19 DIAGNOSIS — M17.11 PRIMARY OSTEOARTHRITIS OF RIGHT KNEE: ICD-10-CM

## 2022-04-19 RX ORDER — MELOXICAM 7.5 MG/1
TABLET ORAL
Qty: 30 TABLET | Refills: 3 | Status: SHIPPED | OUTPATIENT
Start: 2022-04-19 | End: 2022-09-06

## 2022-09-06 DIAGNOSIS — M17.11 PRIMARY OSTEOARTHRITIS OF RIGHT KNEE: ICD-10-CM

## 2022-09-06 RX ORDER — MELOXICAM 7.5 MG/1
TABLET ORAL
Qty: 30 TABLET | Refills: 3 | Status: SHIPPED | OUTPATIENT
Start: 2022-09-06 | End: 2023-01-11

## 2022-09-08 ENCOUNTER — OFFICE VISIT (OUTPATIENT)
Dept: FAMILY MEDICINE CLINIC | Age: 62
End: 2022-09-08

## 2022-09-08 VITALS
HEART RATE: 95 BPM | WEIGHT: 263 LBS | SYSTOLIC BLOOD PRESSURE: 129 MMHG | BODY MASS INDEX: 34.85 KG/M2 | TEMPERATURE: 98.1 F | DIASTOLIC BLOOD PRESSURE: 85 MMHG | HEIGHT: 73 IN

## 2022-09-08 DIAGNOSIS — E11.9 TYPE 2 DIABETES MELLITUS WITHOUT COMPLICATION, WITHOUT LONG-TERM CURRENT USE OF INSULIN: ICD-10-CM

## 2022-09-08 DIAGNOSIS — Z00.00 ANNUAL PHYSICAL EXAM: Primary | ICD-10-CM

## 2022-09-08 DIAGNOSIS — Z12.5 SCREENING FOR MALIGNANT NEOPLASM OF PROSTATE: ICD-10-CM

## 2022-09-08 PROCEDURE — 99396 PREV VISIT EST AGE 40-64: CPT | Performed by: NURSE PRACTITIONER

## 2022-09-08 RX ORDER — LANCETS 28 GAUGE
EACH MISCELLANEOUS
Qty: 100 EACH | Refills: 0 | Status: SHIPPED | OUTPATIENT
Start: 2022-09-08

## 2022-09-08 RX ORDER — BLOOD-GLUCOSE METER
1 KIT MISCELLANEOUS DAILY
Qty: 1 EACH | Refills: 0 | Status: SHIPPED | OUTPATIENT
Start: 2022-09-08 | End: 2023-09-08

## 2022-09-08 RX ORDER — LOSARTAN POTASSIUM 25 MG/1
25 TABLET ORAL DAILY
Qty: 90 TABLET | Refills: 1 | Status: SHIPPED | OUTPATIENT
Start: 2022-09-08 | End: 2023-03-15

## 2022-09-08 RX ORDER — PRAVASTATIN SODIUM 10 MG
10 TABLET ORAL DAILY
Qty: 90 TABLET | Refills: 1 | Status: SHIPPED | OUTPATIENT
Start: 2022-09-08 | End: 2023-03-16 | Stop reason: SDUPTHER

## 2022-09-08 NOTE — PROGRESS NOTES
Chief Complaint  Nolan Browning presents to [facility] for Annual Exam    Subjective          History of Present Illness    Haja is here today for annual preventive exam.   Has had covid vaccine X 3.  UTD Tdap vaccine.   Declines zoster and PNA vaccines.  Normal PSA 8/26/2021.  Underwent colonoscopy 7/17/2019 with Dr Lopez- polyp, diverticulosis . Advised repeat 5 years.   Never smoker.    Haja is following up on type 2 diabetes.  Current meds include an oral hypoglycemic ( Glucophage 500 mg BID) and a statin (Pravastatin 10 mg daily) and an ARB (Losartan 25 mg daily).  Most recent lab results include Hemoglobin A1c:  6.8 (%). UTD eye exam 10/2021 with Dr Hanson. He is scheduled for October again.   He is off Xarelto now. Was taking for DVT diagnosed 8/2021. Has seen vascular and hematology. To follow up PRN.   He has seen Dr Pinon for OA of right knee. Has had cortisone injection. Doing well.      Review of Systems   Constitutional: Negative for chills and fever.   HENT: Negative for ear pain and sore throat.    Eyes: Negative for blurred vision and redness.   Respiratory: Negative for cough, shortness of breath and wheezing.    Cardiovascular: Negative for chest pain and palpitations.   Gastrointestinal: Negative for abdominal pain, constipation, diarrhea, nausea and vomiting.   Genitourinary: Negative for dysuria, frequency and urgency.   Musculoskeletal: Negative for back pain and joint swelling.   Skin: Negative for rash.   Neurological: Negative for dizziness and headache.   Psychiatric/Behavioral: Negative for suicidal ideas and depressed mood.         Allergies   Allergen Reactions   • Lisinopril Cough      Past Medical History:   Diagnosis Date   • Diverticulosis of large intestine without perforation or abscess without bleeding    • DVT (deep venous thrombosis) (HCC)    • Gout, unspecified    • High cholesterol    • Personal history of colonic polyps    • Type 2  diabetes mellitus without complications (HCC)      Current Outpatient Medications   Medication Sig Dispense Refill   • losartan (COZAAR) 25 MG tablet Take 1 tablet by mouth Daily. 90 tablet 1   • meloxicam (MOBIC) 7.5 MG tablet TAKE 1 TABLET BY MOUTH EVERY DAY WITH FOOD 30 tablet 3   • metFORMIN (GLUCOPHAGE) 500 MG tablet Take 1 tablet by mouth 2 (Two) Times a Day. 180 tablet 1   • Misc Natural Products (Osteo Bi-Flex Adv Triple St) tablet Take  by mouth.     • pravastatin (PRAVACHOL) 10 MG tablet Take 1 tablet by mouth Daily. 90 tablet 1   • Turmeric 500 MG capsule Take  by mouth.     • glucose blood test strip Use as instructed 100 each 0   • glucose monitoring kit (FREESTYLE) monitoring kit 1 each Daily. 1 each 0   • Lancets (freestyle) lancets Use as instructed 100 each 0     No current facility-administered medications for this visit.     Past Surgical History:   Procedure Laterality Date   • KNEE CARTILAGE SURGERY Right 01/25/2005    knee arthroscopy    • WISDOM TOOTH EXTRACTION        Social History     Tobacco Use   • Smoking status: Never Smoker   • Smokeless tobacco: Never Used   Vaping Use   • Vaping Use: Never used   Substance Use Topics   • Alcohol use: Yes   • Drug use: Never     Family History   Problem Relation Age of Onset   • Heart disease Mother    • Heart attack Mother         CAUSE OF DEATH   • Dementia Father    • Stroke Father    • Diabetes Brother      Health Maintenance Due   Topic Date Due   • DIABETIC FOOT EXAM  02/19/2022   • ANNUAL PHYSICAL  08/27/2022   • HEMOGLOBIN A1C  09/01/2022      Immunization History   Administered Date(s) Administered   • COVID-19 (MODERNA) 1st, 2nd, 3rd Dose Only 03/04/2021, 04/10/2021, 11/22/2021   • Influenza, Unspecified 02/14/2020   • Tdap 08/19/2020        Objective     Vitals:    09/08/22 0822   BP: 129/85   BP Location: Left arm   Patient Position: Sitting   Pulse: 95   Temp: 98.1 °F (36.7 °C)   TempSrc: Oral   Weight: 119 kg (263 lb)   Height:  "185.4 cm (72.99\")     Body mass index is 34.71 kg/m².     Physical Exam  Vitals reviewed.   Constitutional:       General: He is not in acute distress.     Appearance: Normal appearance.   HENT:      Head: Normocephalic and atraumatic.      Right Ear: Hearing, tympanic membrane and ear canal normal.      Left Ear: Hearing, tympanic membrane and ear canal normal.      Mouth/Throat:      Mouth: Mucous membranes are moist.   Eyes:      Extraocular Movements: Extraocular movements intact.      Pupils: Pupils are equal, round, and reactive to light.   Cardiovascular:      Rate and Rhythm: Normal rate and regular rhythm.      Pulses:           Dorsalis pedis pulses are 2+ on the right side and 2+ on the left side.      Comments: Varicosities BLEs  Pulmonary:      Effort: Pulmonary effort is normal. No respiratory distress.      Breath sounds: Normal breath sounds.   Abdominal:      General: Bowel sounds are normal.      Palpations: Abdomen is soft.      Tenderness: There is no abdominal tenderness.   Musculoskeletal:         General: Normal range of motion.      Cervical back: Normal range of motion and neck supple.   Feet:      Right foot:      Protective Sensation: 3 sites tested. 3 sites sensed.      Skin integrity: Skin integrity normal. No ulcer or blister.      Toenail Condition: Right toenails are normal.      Left foot:      Protective Sensation: 3 sites tested. 3 sites sensed.      Skin integrity: Skin integrity normal. No ulcer or blister.      Toenail Condition: Left toenails are normal.      Comments: Diabetic Foot Exam Performed and Monofilament Test Performed     Skin:     General: Skin is warm and dry.   Neurological:      Mental Status: He is alert and oriented to person, place, and time.   Psychiatric:         Mood and Affect: Mood normal.           Result Review :                               Assessment and Plan      Diagnoses and all orders for this visit:    1. Annual physical exam " (Primary)  Comments:  Appropriate screenings and vaccinations were reviewed with the pt and offered as indicated.  Pt counseled on healthy lifestyle including healthy diet, exercise.    2. Screening for malignant neoplasm of prostate  -     PSA SCREENING; Future    3. Type 2 diabetes mellitus without complication, without long-term current use of insulin (HCC)  Comments:  Medical condition is stable.  Continue same therapy.  Will recheck at next regular appointment.  Orders:  -     Lancets (freestyle) lancets; Use as instructed  Dispense: 100 each; Refill: 0  -     glucose monitoring kit (FREESTYLE) monitoring kit; 1 each Daily.  Dispense: 1 each; Refill: 0  -     glucose blood test strip; Use as instructed  Dispense: 100 each; Refill: 0  -     losartan (COZAAR) 25 MG tablet; Take 1 tablet by mouth Daily.  Dispense: 90 tablet; Refill: 1  -     metFORMIN (GLUCOPHAGE) 500 MG tablet; Take 1 tablet by mouth 2 (Two) Times a Day.  Dispense: 180 tablet; Refill: 1  -     pravastatin (PRAVACHOL) 10 MG tablet; Take 1 tablet by mouth Daily.  Dispense: 90 tablet; Refill: 1  -     Hemoglobin A1c; Future  -     Comprehensive metabolic panel; Future              Follow Up     Return in about 6 months (around 3/8/2023) for Recheck.

## 2022-09-14 ENCOUNTER — LAB (OUTPATIENT)
Dept: LAB | Facility: HOSPITAL | Age: 62
End: 2022-09-14

## 2022-09-14 DIAGNOSIS — E11.9 TYPE 2 DIABETES MELLITUS WITHOUT COMPLICATION, WITHOUT LONG-TERM CURRENT USE OF INSULIN: ICD-10-CM

## 2022-09-14 DIAGNOSIS — Z12.5 SCREENING FOR MALIGNANT NEOPLASM OF PROSTATE: ICD-10-CM

## 2022-09-14 LAB
ALBUMIN SERPL-MCNC: 4 G/DL (ref 3.5–5.2)
ALBUMIN/GLOB SERPL: 1.3 G/DL
ALP SERPL-CCNC: 95 U/L (ref 39–117)
ALT SERPL W P-5'-P-CCNC: 19 U/L (ref 1–41)
ANION GAP SERPL CALCULATED.3IONS-SCNC: 9.2 MMOL/L (ref 5–15)
AST SERPL-CCNC: 23 U/L (ref 1–40)
BILIRUB SERPL-MCNC: 0.5 MG/DL (ref 0–1.2)
BUN SERPL-MCNC: 15 MG/DL (ref 8–23)
BUN/CREAT SERPL: 13.5 (ref 7–25)
CALCIUM SPEC-SCNC: 8.9 MG/DL (ref 8.6–10.5)
CHLORIDE SERPL-SCNC: 106 MMOL/L (ref 98–107)
CO2 SERPL-SCNC: 25.8 MMOL/L (ref 22–29)
CREAT SERPL-MCNC: 1.11 MG/DL (ref 0.76–1.27)
EGFRCR SERPLBLD CKD-EPI 2021: 75.5 ML/MIN/1.73
GLOBULIN UR ELPH-MCNC: 3 GM/DL
GLUCOSE SERPL-MCNC: 105 MG/DL (ref 65–99)
HBA1C MFR BLD: 7.3 % (ref 4.8–5.6)
POTASSIUM SERPL-SCNC: 4.2 MMOL/L (ref 3.5–5.2)
PROT SERPL-MCNC: 7 G/DL (ref 6–8.5)
PSA SERPL-MCNC: 0.5 NG/ML (ref 0–4)
SODIUM SERPL-SCNC: 141 MMOL/L (ref 136–145)

## 2022-09-14 PROCEDURE — 36415 COLL VENOUS BLD VENIPUNCTURE: CPT

## 2022-09-14 PROCEDURE — 83036 HEMOGLOBIN GLYCOSYLATED A1C: CPT

## 2022-09-14 PROCEDURE — 80053 COMPREHEN METABOLIC PANEL: CPT

## 2022-09-14 PROCEDURE — G0103 PSA SCREENING: HCPCS

## 2023-01-10 DIAGNOSIS — M17.11 PRIMARY OSTEOARTHRITIS OF RIGHT KNEE: ICD-10-CM

## 2023-01-11 RX ORDER — MELOXICAM 7.5 MG/1
TABLET ORAL
Qty: 30 TABLET | Refills: 3 | Status: SHIPPED | OUTPATIENT
Start: 2023-01-11 | End: 2023-03-14

## 2023-03-14 DIAGNOSIS — M17.11 PRIMARY OSTEOARTHRITIS OF RIGHT KNEE: ICD-10-CM

## 2023-03-14 DIAGNOSIS — E11.9 TYPE 2 DIABETES MELLITUS WITHOUT COMPLICATION, WITHOUT LONG-TERM CURRENT USE OF INSULIN: ICD-10-CM

## 2023-03-14 RX ORDER — MELOXICAM 7.5 MG/1
TABLET ORAL
Qty: 30 TABLET | Refills: 0 | Status: SHIPPED | OUTPATIENT
Start: 2023-03-14

## 2023-03-15 RX ORDER — LOSARTAN POTASSIUM 25 MG/1
TABLET ORAL
Qty: 90 TABLET | Refills: 1 | Status: SHIPPED | OUTPATIENT
Start: 2023-03-15 | End: 2023-03-16 | Stop reason: SDUPTHER

## 2023-03-16 ENCOUNTER — OFFICE VISIT (OUTPATIENT)
Dept: FAMILY MEDICINE CLINIC | Age: 63
End: 2023-03-16
Payer: COMMERCIAL

## 2023-03-16 VITALS
HEIGHT: 73 IN | BODY MASS INDEX: 35.17 KG/M2 | DIASTOLIC BLOOD PRESSURE: 76 MMHG | WEIGHT: 265.4 LBS | SYSTOLIC BLOOD PRESSURE: 110 MMHG | OXYGEN SATURATION: 99 % | TEMPERATURE: 98.3 F | HEART RATE: 88 BPM

## 2023-03-16 DIAGNOSIS — Z86.718 HISTORY OF DVT OF LOWER EXTREMITY: ICD-10-CM

## 2023-03-16 DIAGNOSIS — E11.9 TYPE 2 DIABETES MELLITUS WITHOUT COMPLICATION, WITHOUT LONG-TERM CURRENT USE OF INSULIN: Primary | ICD-10-CM

## 2023-03-16 PROBLEM — I82.409 DVT (DEEP VENOUS THROMBOSIS): Status: RESOLVED | Noted: 2021-08-26 | Resolved: 2023-03-16

## 2023-03-16 PROCEDURE — 99214 OFFICE O/P EST MOD 30 MIN: CPT | Performed by: NURSE PRACTITIONER

## 2023-03-16 RX ORDER — LOSARTAN POTASSIUM 25 MG/1
25 TABLET ORAL DAILY
Qty: 90 TABLET | Refills: 1 | Status: SHIPPED | OUTPATIENT
Start: 2023-03-16

## 2023-03-16 RX ORDER — PRAVASTATIN SODIUM 10 MG
10 TABLET ORAL DAILY
Qty: 90 TABLET | Refills: 1 | Status: SHIPPED | OUTPATIENT
Start: 2023-03-16

## 2023-03-16 NOTE — PROGRESS NOTES
Chief Complaint  Nolan Browning presents to [facility] for Diabetes    Subjective          History of Present Illness    Haja is following up on type 2 diabetes.  Current meds include an oral hypoglycemic ( Glucophage 500 mg BID) and a statin (Pravastatin 10 mg daily) and an ARB (Losartan 25 mg daily).  Most recent lab results include Hemoglobin A1c:  7.3 (%). UTD eye exam 10/2022 with Dr Hanson.   He had SVT LE 8/2021. Completed Xarelto course. Saw vascular and hematology and advised PRN follow up. He has some swelling at times. He does wear compression stockings at times.     Review of Systems      Allergies   Allergen Reactions   • Lisinopril Cough      Past Medical History:   Diagnosis Date   • Diverticulosis of large intestine without perforation or abscess without bleeding    • DVT (deep venous thrombosis) ([facility])    • Gout, unspecified    • High cholesterol    • Personal history of colonic polyps    • Type 2 diabetes mellitus without complications ([facility])      Current Outpatient Medications   Medication Sig Dispense Refill   • glucose blood test strip Use as instructed 100 each 0   • glucose monitoring kit (FREESTYLE) monitoring kit 1 each Daily. 1 each 0   • Lancets (freestyle) lancets Use as instructed 100 each 0   • losartan (COZAAR) 25 MG tablet Take 1 tablet by mouth Daily. 90 tablet 1   • meloxicam (MOBIC) 7.5 MG tablet TAKE 1 TABLET BY MOUTH EVERY DAY WITH FOOD 30 tablet 0   • metFORMIN (GLUCOPHAGE) 500 MG tablet Take 1 tablet by mouth 2 (Two) Times a Day. 180 tablet 1   • Misc Natural Products (Osteo Bi-Flex Adv Triple St) tablet Take  by mouth Daily.     • pravastatin (PRAVACHOL) 10 MG tablet Take 1 tablet by mouth Daily. 90 tablet 1   • Turmeric 500 MG capsule Take  by mouth Daily.       No current facility-administered medications for this visit.     Past Surgical History:   Procedure Laterality Date   • KNEE CARTILAGE SURGERY Right 01/25/2005    knee arthroscopy  "   • WISDOM TOOTH EXTRACTION        Social History     Tobacco Use   • Smoking status: Never     Passive exposure: Never   • Smokeless tobacco: Never   Vaping Use   • Vaping Use: Never used   Substance Use Topics   • Alcohol use: Yes   • Drug use: Never     Family History   Problem Relation Age of Onset   • Heart disease Mother    • Heart attack Mother         CAUSE OF DEATH   • Dementia Father    • Stroke Father    • Diabetes Brother      Health Maintenance Due   Topic Date Due   • LIPID PANEL  03/01/2023   • URINE MICROALBUMIN  03/01/2023   • HEMOGLOBIN A1C  03/14/2023      Immunization History   Administered Date(s) Administered   • COVID-19 (MODERNA) 1st, 2nd, 3rd Dose Only 03/04/2021, 04/10/2021, 11/22/2021   • Influenza, Unspecified 02/14/2020   • Tdap 08/19/2020        Objective     Vitals:    03/16/23 1037 03/16/23 1043   BP: 128/91 110/76   BP Location: Right arm Left arm   Patient Position: Sitting Sitting   Cuff Size: Large Adult Large Adult   Pulse: 81 88   Temp: 98.3 °F (36.8 °C)    TempSrc: Oral    SpO2: 99%    Weight: 120 kg (265 lb 6.4 oz)    Height: 185.4 cm (72.99\")      Body mass index is 35.02 kg/m².     Physical Exam  Vitals reviewed.   Constitutional:       General: He is not in acute distress.     Appearance: Normal appearance. He is well-developed.   HENT:      Head: Normocephalic and atraumatic.   Cardiovascular:      Rate and Rhythm: Normal rate and regular rhythm.   Pulmonary:      Effort: Pulmonary effort is normal.      Breath sounds: Normal breath sounds.   Neurological:      Mental Status: He is alert and oriented to person, place, and time.   Psychiatric:         Mood and Affect: Mood and affect normal.           Result Review :                               Assessment and Plan      Diagnoses and all orders for this visit:    1. Type 2 diabetes mellitus without complication, without long-term current use of insulin (HCC) (Primary)  Comments:  Medical condition is stable.  " Continue same therapy.  Will recheck at next regular appointment.  Orders:  -     metFORMIN (GLUCOPHAGE) 500 MG tablet; Take 1 tablet by mouth 2 (Two) Times a Day.  Dispense: 180 tablet; Refill: 1  -     pravastatin (PRAVACHOL) 10 MG tablet; Take 1 tablet by mouth Daily.  Dispense: 90 tablet; Refill: 1  -     losartan (COZAAR) 25 MG tablet; Take 1 tablet by mouth Daily.  Dispense: 90 tablet; Refill: 1  -     Comprehensive metabolic panel; Future  -     Hemoglobin A1c; Future  -     Microalbumin / Creatinine Urine Ratio - Urine, Clean Catch; Future  -     Lipid panel; Future  -     CBC No Differential; Future    2. History of DVT of lower extremity  Comments:  Medical condition is stable.  Continue same therapy.  Will recheck at next regular appointment                Follow Up     No follow-ups on file.

## 2023-03-22 ENCOUNTER — LAB (OUTPATIENT)
Dept: LAB | Facility: HOSPITAL | Age: 63
End: 2023-03-22
Payer: COMMERCIAL

## 2023-03-22 DIAGNOSIS — E11.9 TYPE 2 DIABETES MELLITUS WITHOUT COMPLICATION, WITHOUT LONG-TERM CURRENT USE OF INSULIN: ICD-10-CM

## 2023-03-22 LAB
ALBUMIN SERPL-MCNC: 4.2 G/DL (ref 3.5–5.2)
ALBUMIN UR-MCNC: 2.3 MG/DL
ALBUMIN/GLOB SERPL: 1.2 G/DL
ALP SERPL-CCNC: 104 U/L (ref 39–117)
ALT SERPL W P-5'-P-CCNC: 15 U/L (ref 1–41)
ANION GAP SERPL CALCULATED.3IONS-SCNC: 9.2 MMOL/L (ref 5–15)
AST SERPL-CCNC: 20 U/L (ref 1–40)
BILIRUB SERPL-MCNC: 0.7 MG/DL (ref 0–1.2)
BUN SERPL-MCNC: 13 MG/DL (ref 8–23)
BUN/CREAT SERPL: 10 (ref 7–25)
CALCIUM SPEC-SCNC: 9.8 MG/DL (ref 8.6–10.5)
CHLORIDE SERPL-SCNC: 106 MMOL/L (ref 98–107)
CHOLEST SERPL-MCNC: 85 MG/DL (ref 0–200)
CO2 SERPL-SCNC: 23.8 MMOL/L (ref 22–29)
CREAT SERPL-MCNC: 1.3 MG/DL (ref 0.76–1.27)
CREAT UR-MCNC: 207.2 MG/DL
DEPRECATED RDW RBC AUTO: 44.6 FL (ref 37–54)
EGFRCR SERPLBLD CKD-EPI 2021: 62.1 ML/MIN/1.73
ERYTHROCYTE [DISTWIDTH] IN BLOOD BY AUTOMATED COUNT: 13.4 % (ref 12.3–15.4)
GLOBULIN UR ELPH-MCNC: 3.5 GM/DL
GLUCOSE SERPL-MCNC: 112 MG/DL (ref 65–99)
HBA1C MFR BLD: 6.8 % (ref 4.8–5.6)
HCT VFR BLD AUTO: 48 % (ref 37.5–51)
HDLC SERPL-MCNC: 31 MG/DL (ref 40–60)
HGB BLD-MCNC: 15.9 G/DL (ref 13–17.7)
LDLC SERPL CALC-MCNC: 39 MG/DL (ref 0–100)
LDLC/HDLC SERPL: 1.33 {RATIO}
MCH RBC QN AUTO: 29.8 PG (ref 26.6–33)
MCHC RBC AUTO-ENTMCNC: 33.1 G/DL (ref 31.5–35.7)
MCV RBC AUTO: 89.9 FL (ref 79–97)
MICROALBUMIN/CREAT UR: 11.1 MG/G
PLATELET # BLD AUTO: 264 10*3/MM3 (ref 140–450)
PMV BLD AUTO: 9.7 FL (ref 6–12)
POTASSIUM SERPL-SCNC: 4.5 MMOL/L (ref 3.5–5.2)
PROT SERPL-MCNC: 7.7 G/DL (ref 6–8.5)
RBC # BLD AUTO: 5.34 10*6/MM3 (ref 4.14–5.8)
SODIUM SERPL-SCNC: 139 MMOL/L (ref 136–145)
TRIGL SERPL-MCNC: 64 MG/DL (ref 0–150)
VLDLC SERPL-MCNC: 15 MG/DL (ref 5–40)
WBC NRBC COR # BLD: 8.59 10*3/MM3 (ref 3.4–10.8)

## 2023-03-22 PROCEDURE — 80053 COMPREHEN METABOLIC PANEL: CPT

## 2023-03-22 PROCEDURE — 83036 HEMOGLOBIN GLYCOSYLATED A1C: CPT

## 2023-03-22 PROCEDURE — 85027 COMPLETE CBC AUTOMATED: CPT

## 2023-03-22 PROCEDURE — 82570 ASSAY OF URINE CREATININE: CPT

## 2023-03-22 PROCEDURE — 80061 LIPID PANEL: CPT

## 2023-03-22 PROCEDURE — 36415 COLL VENOUS BLD VENIPUNCTURE: CPT

## 2023-03-22 PROCEDURE — 82043 UR ALBUMIN QUANTITATIVE: CPT

## 2023-04-30 DIAGNOSIS — E11.9 TYPE 2 DIABETES MELLITUS WITHOUT COMPLICATION, WITHOUT LONG-TERM CURRENT USE OF INSULIN: ICD-10-CM

## 2023-05-01 RX ORDER — PRAVASTATIN SODIUM 10 MG
TABLET ORAL
Qty: 90 TABLET | Refills: 1 | OUTPATIENT
Start: 2023-05-01

## 2023-09-13 ENCOUNTER — OFFICE VISIT (OUTPATIENT)
Dept: FAMILY MEDICINE CLINIC | Age: 63
End: 2023-09-13
Payer: COMMERCIAL

## 2023-09-13 VITALS
BODY MASS INDEX: 34.4 KG/M2 | DIASTOLIC BLOOD PRESSURE: 77 MMHG | HEIGHT: 73 IN | SYSTOLIC BLOOD PRESSURE: 119 MMHG | RESPIRATION RATE: 16 BRPM | HEART RATE: 82 BPM | OXYGEN SATURATION: 98 % | WEIGHT: 259.6 LBS

## 2023-09-13 DIAGNOSIS — Z00.00 ANNUAL PHYSICAL EXAM: Primary | ICD-10-CM

## 2023-09-13 DIAGNOSIS — M17.11 PRIMARY OSTEOARTHRITIS OF RIGHT KNEE: ICD-10-CM

## 2023-09-13 DIAGNOSIS — Z12.5 SCREENING FOR MALIGNANT NEOPLASM OF PROSTATE: ICD-10-CM

## 2023-09-13 DIAGNOSIS — E11.9 TYPE 2 DIABETES MELLITUS WITHOUT COMPLICATION, WITHOUT LONG-TERM CURRENT USE OF INSULIN: ICD-10-CM

## 2023-09-13 RX ORDER — PRAVASTATIN SODIUM 10 MG
10 TABLET ORAL DAILY
Qty: 90 TABLET | Refills: 1 | Status: SHIPPED | OUTPATIENT
Start: 2023-09-13

## 2023-09-13 RX ORDER — LOSARTAN POTASSIUM 25 MG/1
25 TABLET ORAL DAILY
Qty: 90 TABLET | Refills: 1 | Status: SHIPPED | OUTPATIENT
Start: 2023-09-13

## 2023-09-13 RX ORDER — MELOXICAM 7.5 MG/1
7.5 TABLET ORAL DAILY PRN
Qty: 90 TABLET | Refills: 1 | Status: SHIPPED | OUTPATIENT
Start: 2023-09-13

## 2023-09-13 NOTE — PROGRESS NOTES
Chief Complaint  Nolan Browning presents to [facility] for Annual Exam and Knee Pain (Right knee )    Subjective          History of Present Illness    Haja is here today for annual preventive exam.   Has had covid vaccine X 3.  UTD Tdap vaccine.   Declines influenza, zoster and PNA vaccines.  Normal PSA 09/14/2022.  Underwent colonoscopy 7/17/2019 with Dr Lopez- polyp, diverticulosis . Advised repeat 5 years.   Never smoker.    Haja is following up on type 2 diabetes.  Current meds include an oral hypoglycemic ( Glucophage) and a statin (Pravastatin) and an ARB (Losartan).  Most recent lab results include Hemoglobin A1c:  6.8(%). UTD eye exam 10/2022 with Dr Hanson. He is scheduled for repeat on 10/26/23.  He had SVT LE 8/2021. Completed Xarelto course. Saw vascular and hematology and advised PRN follow up.   C/o right knee pain. Saw ortho Dr Pinon. Was prescribed meloxicam. He can tell a difference since he has not been taking recently. Would like to restart.     Review of Systems   Constitutional:  Negative for chills and fever.   HENT:  Negative for ear pain and sore throat.    Eyes:  Negative for blurred vision and redness.   Respiratory:  Negative for cough, shortness of breath and wheezing.    Cardiovascular:  Negative for chest pain and palpitations.   Gastrointestinal:  Negative for abdominal pain, nausea and vomiting.   Genitourinary:  Negative for dysuria, frequency and urgency.   Musculoskeletal:  Negative for back pain.        Right knee pain     Skin:  Negative for rash.   Neurological:  Negative for seizures and syncope.   Psychiatric/Behavioral:  Negative for suicidal ideas and depressed mood.        Allergies   Allergen Reactions    Lisinopril Cough      Past Medical History:   Diagnosis Date    Diverticulosis of large intestine without perforation or abscess without bleeding     DVT (deep venous thrombosis)     Gout, unspecified     High cholesterol      Personal history of colonic polyps     Type 2 diabetes mellitus without complications      Current Outpatient Medications   Medication Sig Dispense Refill    glucose blood test strip Use as instructed 100 each 0    Lancets (freestyle) lancets Use as instructed 100 each 0    losartan (COZAAR) 25 MG tablet Take 1 tablet by mouth Daily. 90 tablet 1    meloxicam (MOBIC) 7.5 MG tablet Take 1 tablet by mouth Daily As Needed for Moderate Pain. with food 90 tablet 1    metFORMIN (GLUCOPHAGE) 500 MG tablet Take 1 tablet by mouth 2 (Two) Times a Day. 180 tablet 1    Misc Natural Products (Osteo Bi-Flex Adv Triple St) tablet Take  by mouth Daily.      pravastatin (PRAVACHOL) 10 MG tablet Take 1 tablet by mouth Daily. 90 tablet 1    Turmeric 500 MG capsule Take  by mouth Daily.       No current facility-administered medications for this visit.     Past Surgical History:   Procedure Laterality Date    KNEE CARTILAGE SURGERY Right 01/25/2005    knee arthroscopy     WISDOM TOOTH EXTRACTION        Social History     Tobacco Use    Smoking status: Never     Passive exposure: Never    Smokeless tobacco: Never   Vaping Use    Vaping Use: Never used   Substance Use Topics    Alcohol use: Yes    Drug use: Never     Family History   Problem Relation Age of Onset    Heart disease Mother     Heart attack Mother         CAUSE OF DEATH    Dementia Father     Stroke Father     Diabetes Brother      Health Maintenance Due   Topic Date Due    BMI FOLLOWUP  Never done    ANNUAL PHYSICAL  09/08/2023    DIABETIC FOOT EXAM  09/08/2023      Immunization History   Administered Date(s) Administered    COVID-19 (MODERNA) 1st,2nd,3rd Dose Monovalent 03/04/2021, 04/10/2021, 11/22/2021    Influenza, Unspecified 02/14/2020    Tdap 08/19/2020        Objective     Vitals:    09/13/23 0916   BP: 119/77   BP Location: Right arm   Patient Position: Sitting   Cuff Size: Adult   Pulse: 82   Resp: 16   SpO2: 98%   Weight: 118 kg (259 lb 9.6 oz)   Height:  "185.4 cm (72.99\")     Body mass index is 34.26 kg/m².     BMI is >= 30 and <35. (Class 1 Obesity). The following options were offered after discussion;: exercise counseling/recommendations and nutrition counseling/recommendations       Physical Exam  Vitals reviewed.   Constitutional:       General: He is not in acute distress.     Appearance: Normal appearance.   HENT:      Head: Normocephalic and atraumatic.      Right Ear: Hearing, tympanic membrane and ear canal normal.      Left Ear: Hearing, tympanic membrane and ear canal normal.      Mouth/Throat:      Mouth: Mucous membranes are moist.   Eyes:      Extraocular Movements: Extraocular movements intact.      Pupils: Pupils are equal, round, and reactive to light.   Cardiovascular:      Rate and Rhythm: Normal rate and regular rhythm.      Pulses:           Dorsalis pedis pulses are 2+ on the right side and 2+ on the left side.   Pulmonary:      Effort: Pulmonary effort is normal. No respiratory distress.      Breath sounds: Normal breath sounds.   Abdominal:      General: Bowel sounds are normal.      Palpations: Abdomen is soft.      Tenderness: There is no abdominal tenderness.   Musculoskeletal:         General: Normal range of motion.      Cervical back: Normal range of motion and neck supple.   Feet:      Right foot:      Protective Sensation: 3 sites tested.  3 sites sensed.      Skin integrity: Skin integrity normal. No ulcer or blister.      Left foot:      Protective Sensation: 3 sites tested.  3 sites sensed.      Skin integrity: Skin integrity normal. No ulcer or blister.      Toenail Condition: Left toenails are normal.      Comments: Diabetic Foot Exam Performed and Monofilament Test Performed     Right great toenail short and darkened. Reports that he lost it previously after being hit by baseball and it growing out.  Skin:     General: Skin is warm and dry.   Neurological:      Mental Status: He is alert and oriented to person, place, and time. "   Psychiatric:         Mood and Affect: Mood normal.         Result Review :                               Assessment and Plan      Diagnoses and all orders for this visit:    1. Annual physical exam (Primary)  Comments:  Appropriate screenings and vaccinations were reviewed with the pt and offered as indicated.  Pt counseled on healthy lifestyle including healthy diet, exercise.  Orders:  -     Hemoglobin A1c; Future  -     Lipid panel; Future  -     Comprehensive metabolic panel; Future    2. Type 2 diabetes mellitus without complication, without long-term current use of insulin  Comments:  Medical condition is stable.  Continue same therapy.  Will recheck at next regular appointment.  Orders:  -     metFORMIN (GLUCOPHAGE) 500 MG tablet; Take 1 tablet by mouth 2 (Two) Times a Day.  Dispense: 180 tablet; Refill: 1  -     losartan (COZAAR) 25 MG tablet; Take 1 tablet by mouth Daily.  Dispense: 90 tablet; Refill: 1  -     pravastatin (PRAVACHOL) 10 MG tablet; Take 1 tablet by mouth Daily.  Dispense: 90 tablet; Refill: 1    3. Primary osteoarthritis of right knee  Comments:  Will restart meloxicam.  Orders:  -     meloxicam (MOBIC) 7.5 MG tablet; Take 1 tablet by mouth Daily As Needed for Moderate Pain. with food  Dispense: 90 tablet; Refill: 1    4. Screening for malignant neoplasm of prostate  Comments:  Will return for labs 9/15/23 or after.  Orders:  -     PSA SCREENING; Future                Follow Up     Return in about 6 months (around 3/13/2024) for Recheck.

## 2023-10-04 ENCOUNTER — LAB (OUTPATIENT)
Dept: LAB | Facility: HOSPITAL | Age: 63
End: 2023-10-04
Payer: COMMERCIAL

## 2023-10-04 DIAGNOSIS — Z00.00 ANNUAL PHYSICAL EXAM: ICD-10-CM

## 2023-10-04 DIAGNOSIS — Z12.5 SCREENING FOR MALIGNANT NEOPLASM OF PROSTATE: ICD-10-CM

## 2023-10-04 LAB
ALBUMIN SERPL-MCNC: 4.1 G/DL (ref 3.5–5.2)
ALBUMIN/GLOB SERPL: 1.4 G/DL
ALP SERPL-CCNC: 96 U/L (ref 39–117)
ALT SERPL W P-5'-P-CCNC: 14 U/L (ref 1–41)
ANION GAP SERPL CALCULATED.3IONS-SCNC: 7.6 MMOL/L (ref 5–15)
AST SERPL-CCNC: 17 U/L (ref 1–40)
BILIRUB SERPL-MCNC: 0.5 MG/DL (ref 0–1.2)
BUN SERPL-MCNC: 15 MG/DL (ref 8–23)
BUN/CREAT SERPL: 11.2 (ref 7–25)
CALCIUM SPEC-SCNC: 9.1 MG/DL (ref 8.6–10.5)
CHLORIDE SERPL-SCNC: 106 MMOL/L (ref 98–107)
CHOLEST SERPL-MCNC: 133 MG/DL (ref 0–200)
CO2 SERPL-SCNC: 26.4 MMOL/L (ref 22–29)
CREAT SERPL-MCNC: 1.34 MG/DL (ref 0.76–1.27)
EGFRCR SERPLBLD CKD-EPI 2021: 59.9 ML/MIN/1.73
GLOBULIN UR ELPH-MCNC: 2.9 GM/DL
GLUCOSE SERPL-MCNC: 101 MG/DL (ref 65–99)
HBA1C MFR BLD: 6.9 % (ref 4.8–5.6)
HDLC SERPL-MCNC: 36 MG/DL (ref 40–60)
LDLC SERPL CALC-MCNC: 70 MG/DL (ref 0–100)
LDLC/HDLC SERPL: 1.82 {RATIO}
POTASSIUM SERPL-SCNC: 4.2 MMOL/L (ref 3.5–5.2)
PROT SERPL-MCNC: 7 G/DL (ref 6–8.5)
PSA SERPL-MCNC: 0.57 NG/ML (ref 0–4)
SODIUM SERPL-SCNC: 140 MMOL/L (ref 136–145)
TRIGL SERPL-MCNC: 158 MG/DL (ref 0–150)
VLDLC SERPL-MCNC: 27 MG/DL (ref 5–40)

## 2023-10-04 PROCEDURE — 36415 COLL VENOUS BLD VENIPUNCTURE: CPT

## 2023-10-04 PROCEDURE — 80061 LIPID PANEL: CPT

## 2023-10-04 PROCEDURE — 80053 COMPREHEN METABOLIC PANEL: CPT

## 2023-10-04 PROCEDURE — 83036 HEMOGLOBIN GLYCOSYLATED A1C: CPT

## 2023-10-04 PROCEDURE — G0103 PSA SCREENING: HCPCS

## 2024-02-28 DIAGNOSIS — E11.9 TYPE 2 DIABETES MELLITUS WITHOUT COMPLICATION, WITHOUT LONG-TERM CURRENT USE OF INSULIN: ICD-10-CM

## 2024-02-28 DIAGNOSIS — M17.11 PRIMARY OSTEOARTHRITIS OF RIGHT KNEE: ICD-10-CM

## 2024-02-28 RX ORDER — PRAVASTATIN SODIUM 10 MG
10 TABLET ORAL DAILY
Qty: 90 TABLET | Refills: 1 | Status: SHIPPED | OUTPATIENT
Start: 2024-02-28

## 2024-02-28 RX ORDER — MELOXICAM 7.5 MG/1
TABLET ORAL
Qty: 90 TABLET | Refills: 1 | Status: SHIPPED | OUTPATIENT
Start: 2024-02-28

## 2024-03-18 ENCOUNTER — OFFICE VISIT (OUTPATIENT)
Dept: FAMILY MEDICINE CLINIC | Age: 64
End: 2024-03-18
Payer: COMMERCIAL

## 2024-03-18 VITALS
SYSTOLIC BLOOD PRESSURE: 118 MMHG | HEIGHT: 73 IN | OXYGEN SATURATION: 93 % | HEART RATE: 87 BPM | DIASTOLIC BLOOD PRESSURE: 79 MMHG | WEIGHT: 268.2 LBS | BODY MASS INDEX: 35.54 KG/M2 | TEMPERATURE: 98.5 F

## 2024-03-18 DIAGNOSIS — E11.9 TYPE 2 DIABETES MELLITUS WITHOUT COMPLICATION, WITHOUT LONG-TERM CURRENT USE OF INSULIN: ICD-10-CM

## 2024-03-18 DIAGNOSIS — M17.11 PRIMARY OSTEOARTHRITIS OF RIGHT KNEE: ICD-10-CM

## 2024-03-18 RX ORDER — PRAVASTATIN SODIUM 10 MG
10 TABLET ORAL DAILY
Qty: 90 TABLET | Refills: 1 | Status: SHIPPED | OUTPATIENT
Start: 2024-03-18

## 2024-03-18 RX ORDER — LOSARTAN POTASSIUM 25 MG/1
25 TABLET ORAL DAILY
Qty: 90 TABLET | Refills: 1 | Status: SHIPPED | OUTPATIENT
Start: 2024-03-18

## 2024-03-18 RX ORDER — MELOXICAM 7.5 MG/1
7.5 TABLET ORAL DAILY
Qty: 90 TABLET | Refills: 1 | Status: SHIPPED | OUTPATIENT
Start: 2024-03-18

## 2024-03-18 NOTE — PROGRESS NOTES
Chief Complaint  Nolan Browning presents to [facility] for Diabetes    Subjective          History of Present Illness    Haja is following up on type 2 diabetes.  Current meds include an oral hypoglycemic ( Glucophage) and a statin (Pravastatin) and an ARB (Losartan).  Most recent lab results include Hemoglobin A1c:  6.9(%). UTD eye exam with Dr Hanson.   He had SVT LE 8/2021. Completed Xarelto course. Saw vascular and hematology and advised PRN follow up.   Previously saw ortho Dr Pinon for OA right knee. He had steroid injection. Taking meloxicam with relief.     Review of Systems      Allergies   Allergen Reactions    Lisinopril Cough      Past Medical History:   Diagnosis Date    Diverticulosis of large intestine without perforation or abscess without bleeding     DVT (deep venous thrombosis)     Gout, unspecified     High cholesterol     Personal history of colonic polyps     Type 2 diabetes mellitus without complications      Current Outpatient Medications   Medication Sig Dispense Refill    glucose blood test strip Use as instructed 100 each 0    Lancets (freestyle) lancets Use as instructed 100 each 0    losartan (COZAAR) 25 MG tablet Take 1 tablet by mouth Daily. 90 tablet 1    meloxicam (MOBIC) 7.5 MG tablet Take 1 tablet by mouth Daily. 90 tablet 1    metFORMIN (GLUCOPHAGE) 500 MG tablet Take 1 tablet by mouth 2 (Two) Times a Day. 180 tablet 1    Misc Natural Products (Osteo Bi-Flex Adv Triple St) tablet Take  by mouth Daily.      pravastatin (PRAVACHOL) 10 MG tablet Take 1 tablet by mouth Daily. 90 tablet 1    Turmeric 500 MG capsule Take  by mouth Daily.       No current facility-administered medications for this visit.     Past Surgical History:   Procedure Laterality Date    KNEE CARTILAGE SURGERY Right 01/25/2005    knee arthroscopy     WISDOM TOOTH EXTRACTION        Social History     Tobacco Use    Smoking status: Never     Passive exposure: Never     "Smokeless tobacco: Never   Vaping Use    Vaping status: Never Used   Substance Use Topics    Alcohol use: Yes    Drug use: Never     Family History   Problem Relation Age of Onset    Heart disease Mother     Heart attack Mother         CAUSE OF DEATH    Dementia Father     Stroke Father     Diabetes Brother      There are no preventive care reminders to display for this patient.     Immunization History   Administered Date(s) Administered    COVID-19 (MODERNA) 1st,2nd,3rd Dose Monovalent 03/04/2021, 04/10/2021, 11/22/2021    Influenza, Unspecified 02/14/2020    Tdap 08/19/2020        Objective     Vitals:    03/18/24 1321   BP: 118/79   BP Location: Right arm   Patient Position: Sitting   Cuff Size: Large Adult   Pulse: 87   Temp: 98.5 °F (36.9 °C)   TempSrc: Oral   SpO2: 93%   Weight: 122 kg (268 lb 3.2 oz)   Height: 185.4 cm (72.99\")     Body mass index is 35.39 kg/m².             Physical Exam  Vitals reviewed.   Constitutional:       General: He is not in acute distress.     Appearance: Normal appearance. He is well-developed.   HENT:      Head: Normocephalic and atraumatic.   Cardiovascular:      Rate and Rhythm: Normal rate and regular rhythm.   Pulmonary:      Effort: Pulmonary effort is normal.      Breath sounds: Normal breath sounds.   Neurological:      Mental Status: He is alert and oriented to person, place, and time.   Psychiatric:         Mood and Affect: Mood and affect normal.           Result Review :                               Assessment and Plan      Diagnoses and all orders for this visit:    1. Type 2 diabetes mellitus without complication, without long-term current use of insulin  Comments:  Medical condition is stable.  Continue same therapy.  Will recheck at next regular appointment.  Orders:  -     metFORMIN (GLUCOPHAGE) 500 MG tablet; Take 1 tablet by mouth 2 (Two) Times a Day.  Dispense: 180 tablet; Refill: 1  -     losartan (COZAAR) 25 MG tablet; Take 1 tablet by mouth Daily.  " Dispense: 90 tablet; Refill: 1  -     pravastatin (PRAVACHOL) 10 MG tablet; Take 1 tablet by mouth Daily.  Dispense: 90 tablet; Refill: 1  -     Comprehensive metabolic panel; Future  -     Hemoglobin A1c; Future    2. Primary osteoarthritis of right knee  Comments:  Medical condition is stable.  Continue same therapy.  Will recheck at next regular appointment  Orders:  -     meloxicam (MOBIC) 7.5 MG tablet; Take 1 tablet by mouth Daily.  Dispense: 90 tablet; Refill: 1            Follow Up     Return in about 6 months (around 9/18/2024).

## 2024-03-25 ENCOUNTER — LAB (OUTPATIENT)
Dept: LAB | Facility: HOSPITAL | Age: 64
End: 2024-03-25
Payer: COMMERCIAL

## 2024-03-25 DIAGNOSIS — E11.9 TYPE 2 DIABETES MELLITUS WITHOUT COMPLICATION, WITHOUT LONG-TERM CURRENT USE OF INSULIN: ICD-10-CM

## 2024-03-25 LAB
ALBUMIN SERPL-MCNC: 4.1 G/DL (ref 3.5–5.2)
ALBUMIN/GLOB SERPL: 1.4 G/DL
ALP SERPL-CCNC: 103 U/L (ref 39–117)
ALT SERPL W P-5'-P-CCNC: 21 U/L (ref 1–41)
ANION GAP SERPL CALCULATED.3IONS-SCNC: 7 MMOL/L (ref 5–15)
AST SERPL-CCNC: 20 U/L (ref 1–40)
BILIRUB SERPL-MCNC: 0.9 MG/DL (ref 0–1.2)
BUN SERPL-MCNC: 14 MG/DL (ref 8–23)
BUN/CREAT SERPL: 10.9 (ref 7–25)
CALCIUM SPEC-SCNC: 9.4 MG/DL (ref 8.6–10.5)
CHLORIDE SERPL-SCNC: 106 MMOL/L (ref 98–107)
CO2 SERPL-SCNC: 27 MMOL/L (ref 22–29)
CREAT SERPL-MCNC: 1.29 MG/DL (ref 0.76–1.27)
EGFRCR SERPLBLD CKD-EPI 2021: 62.3 ML/MIN/1.73
GLOBULIN UR ELPH-MCNC: 2.9 GM/DL
GLUCOSE SERPL-MCNC: 106 MG/DL (ref 65–99)
HBA1C MFR BLD: 7.3 % (ref 4.8–5.6)
POTASSIUM SERPL-SCNC: 4.3 MMOL/L (ref 3.5–5.2)
PROT SERPL-MCNC: 7 G/DL (ref 6–8.5)
SODIUM SERPL-SCNC: 140 MMOL/L (ref 136–145)

## 2024-03-25 PROCEDURE — 83036 HEMOGLOBIN GLYCOSYLATED A1C: CPT

## 2024-03-25 PROCEDURE — 36415 COLL VENOUS BLD VENIPUNCTURE: CPT

## 2024-03-25 PROCEDURE — 80053 COMPREHEN METABOLIC PANEL: CPT

## 2024-04-10 DIAGNOSIS — E11.9 TYPE 2 DIABETES MELLITUS WITHOUT COMPLICATION, WITHOUT LONG-TERM CURRENT USE OF INSULIN: ICD-10-CM

## 2024-04-10 RX ORDER — LOSARTAN POTASSIUM 25 MG/1
25 TABLET ORAL DAILY
Qty: 90 TABLET | Refills: 1 | OUTPATIENT
Start: 2024-04-10

## 2024-08-25 DIAGNOSIS — E11.9 TYPE 2 DIABETES MELLITUS WITHOUT COMPLICATION, WITHOUT LONG-TERM CURRENT USE OF INSULIN: ICD-10-CM

## 2024-08-25 DIAGNOSIS — M17.11 PRIMARY OSTEOARTHRITIS OF RIGHT KNEE: ICD-10-CM

## 2024-08-26 RX ORDER — PRAVASTATIN SODIUM 10 MG
10 TABLET ORAL DAILY
Qty: 90 TABLET | Refills: 1 | Status: SHIPPED | OUTPATIENT
Start: 2024-08-26

## 2024-08-27 RX ORDER — MELOXICAM 7.5 MG/1
7.5 TABLET ORAL DAILY
Qty: 90 TABLET | Refills: 0 | Status: SHIPPED | OUTPATIENT
Start: 2024-08-27

## 2024-09-08 DIAGNOSIS — E11.9 TYPE 2 DIABETES MELLITUS WITHOUT COMPLICATION, WITHOUT LONG-TERM CURRENT USE OF INSULIN: ICD-10-CM

## 2024-09-09 RX ORDER — LOSARTAN POTASSIUM 25 MG/1
25 TABLET ORAL DAILY
Qty: 90 TABLET | Refills: 1 | Status: SHIPPED | OUTPATIENT
Start: 2024-09-09

## 2024-09-18 ENCOUNTER — OFFICE VISIT (OUTPATIENT)
Dept: FAMILY MEDICINE CLINIC | Age: 64
End: 2024-09-18
Payer: COMMERCIAL

## 2024-09-18 VITALS
HEART RATE: 82 BPM | SYSTOLIC BLOOD PRESSURE: 115 MMHG | DIASTOLIC BLOOD PRESSURE: 68 MMHG | WEIGHT: 262.6 LBS | BODY MASS INDEX: 34.8 KG/M2 | HEIGHT: 73 IN | TEMPERATURE: 98.4 F | OXYGEN SATURATION: 96 %

## 2024-09-18 DIAGNOSIS — Z12.11 SCREENING FOR COLORECTAL CANCER: ICD-10-CM

## 2024-09-18 DIAGNOSIS — Z12.12 SCREENING FOR COLORECTAL CANCER: ICD-10-CM

## 2024-09-18 DIAGNOSIS — Z00.00 ANNUAL PHYSICAL EXAM: Primary | ICD-10-CM

## 2024-09-18 DIAGNOSIS — E11.9 TYPE 2 DIABETES MELLITUS WITHOUT COMPLICATION, WITHOUT LONG-TERM CURRENT USE OF INSULIN: ICD-10-CM

## 2024-09-18 PROCEDURE — 99396 PREV VISIT EST AGE 40-64: CPT | Performed by: NURSE PRACTITIONER

## 2024-09-25 ENCOUNTER — LAB (OUTPATIENT)
Dept: LAB | Facility: HOSPITAL | Age: 64
End: 2024-09-25
Payer: COMMERCIAL

## 2024-09-25 DIAGNOSIS — Z00.00 ANNUAL PHYSICAL EXAM: ICD-10-CM

## 2024-09-25 DIAGNOSIS — E11.9 TYPE 2 DIABETES MELLITUS WITHOUT COMPLICATION, WITHOUT LONG-TERM CURRENT USE OF INSULIN: ICD-10-CM

## 2024-09-25 LAB
ALBUMIN SERPL-MCNC: 4.1 G/DL (ref 3.5–5.2)
ALBUMIN UR-MCNC: <1.2 MG/DL
ALBUMIN/GLOB SERPL: 1.4 G/DL
ALP SERPL-CCNC: 114 U/L (ref 39–117)
ALT SERPL W P-5'-P-CCNC: 18 U/L (ref 1–41)
ANION GAP SERPL CALCULATED.3IONS-SCNC: 8 MMOL/L (ref 5–15)
AST SERPL-CCNC: 22 U/L (ref 1–40)
BILIRUB SERPL-MCNC: 0.6 MG/DL (ref 0–1.2)
BUN SERPL-MCNC: 12 MG/DL (ref 8–23)
BUN/CREAT SERPL: 9.2 (ref 7–25)
CALCIUM SPEC-SCNC: 9.3 MG/DL (ref 8.6–10.5)
CHLORIDE SERPL-SCNC: 104 MMOL/L (ref 98–107)
CHOLEST SERPL-MCNC: 139 MG/DL (ref 0–200)
CO2 SERPL-SCNC: 25 MMOL/L (ref 22–29)
CREAT SERPL-MCNC: 1.31 MG/DL (ref 0.76–1.27)
CREAT UR-MCNC: 172.3 MG/DL
DEPRECATED RDW RBC AUTO: 44.5 FL (ref 37–54)
EGFRCR SERPLBLD CKD-EPI 2021: 61.2 ML/MIN/1.73
ERYTHROCYTE [DISTWIDTH] IN BLOOD BY AUTOMATED COUNT: 13 % (ref 12.3–15.4)
GLOBULIN UR ELPH-MCNC: 3 GM/DL
GLUCOSE SERPL-MCNC: 129 MG/DL (ref 65–99)
HBA1C MFR BLD: 7.1 % (ref 4.8–5.6)
HCT VFR BLD AUTO: 47.8 % (ref 37.5–51)
HDLC SERPL-MCNC: 37 MG/DL (ref 40–60)
HGB BLD-MCNC: 15.5 G/DL (ref 13–17.7)
LDLC SERPL CALC-MCNC: 80 MG/DL (ref 0–100)
LDLC/HDLC SERPL: 2.1 {RATIO}
MCH RBC QN AUTO: 29.8 PG (ref 26.6–33)
MCHC RBC AUTO-ENTMCNC: 32.4 G/DL (ref 31.5–35.7)
MCV RBC AUTO: 91.9 FL (ref 79–97)
MICROALBUMIN/CREAT UR: NORMAL MG/G{CREAT}
PLATELET # BLD AUTO: 212 10*3/MM3 (ref 140–450)
PMV BLD AUTO: 10.1 FL (ref 6–12)
POTASSIUM SERPL-SCNC: 4.5 MMOL/L (ref 3.5–5.2)
PROT SERPL-MCNC: 7.1 G/DL (ref 6–8.5)
RBC # BLD AUTO: 5.2 10*6/MM3 (ref 4.14–5.8)
SODIUM SERPL-SCNC: 137 MMOL/L (ref 136–145)
TRIGL SERPL-MCNC: 122 MG/DL (ref 0–150)
VLDLC SERPL-MCNC: 22 MG/DL (ref 5–40)
WBC NRBC COR # BLD AUTO: 6.7 10*3/MM3 (ref 3.4–10.8)

## 2024-09-25 PROCEDURE — 83036 HEMOGLOBIN GLYCOSYLATED A1C: CPT

## 2024-09-25 PROCEDURE — 36415 COLL VENOUS BLD VENIPUNCTURE: CPT

## 2024-09-25 PROCEDURE — 82570 ASSAY OF URINE CREATININE: CPT

## 2024-09-25 PROCEDURE — 80053 COMPREHEN METABOLIC PANEL: CPT

## 2024-09-25 PROCEDURE — 80061 LIPID PANEL: CPT

## 2024-09-25 PROCEDURE — 82043 UR ALBUMIN QUANTITATIVE: CPT

## 2024-09-25 PROCEDURE — 85027 COMPLETE CBC AUTOMATED: CPT

## 2025-02-04 DIAGNOSIS — E11.9 TYPE 2 DIABETES MELLITUS WITHOUT COMPLICATION, WITHOUT LONG-TERM CURRENT USE OF INSULIN: ICD-10-CM

## 2025-02-04 RX ORDER — PRAVASTATIN SODIUM 10 MG
10 TABLET ORAL DAILY
Qty: 90 TABLET | Refills: 1 | Status: SHIPPED | OUTPATIENT
Start: 2025-02-04

## 2025-02-21 DIAGNOSIS — E11.9 TYPE 2 DIABETES MELLITUS WITHOUT COMPLICATION, WITHOUT LONG-TERM CURRENT USE OF INSULIN: ICD-10-CM

## 2025-02-21 RX ORDER — LOSARTAN POTASSIUM 25 MG/1
25 TABLET ORAL DAILY
Qty: 90 TABLET | Refills: 1 | Status: SHIPPED | OUTPATIENT
Start: 2025-02-21

## 2025-03-20 DIAGNOSIS — M17.11 PRIMARY OSTEOARTHRITIS OF RIGHT KNEE: ICD-10-CM

## 2025-03-21 RX ORDER — MELOXICAM 7.5 MG/1
7.5 TABLET ORAL DAILY
Qty: 90 TABLET | Refills: 0 | Status: SHIPPED | OUTPATIENT
Start: 2025-03-21

## 2025-04-02 ENCOUNTER — TELEPHONE (OUTPATIENT)
Dept: FAMILY MEDICINE CLINIC | Age: 65
End: 2025-04-02

## 2025-04-02 ENCOUNTER — OFFICE VISIT (OUTPATIENT)
Dept: FAMILY MEDICINE CLINIC | Age: 65
End: 2025-04-02
Payer: COMMERCIAL

## 2025-04-02 VITALS
HEART RATE: 76 BPM | SYSTOLIC BLOOD PRESSURE: 124 MMHG | OXYGEN SATURATION: 95 % | BODY MASS INDEX: 35.09 KG/M2 | TEMPERATURE: 98.8 F | WEIGHT: 264.8 LBS | HEIGHT: 73 IN | DIASTOLIC BLOOD PRESSURE: 86 MMHG

## 2025-04-02 DIAGNOSIS — M17.11 PRIMARY OSTEOARTHRITIS OF RIGHT KNEE: ICD-10-CM

## 2025-04-02 DIAGNOSIS — E11.9 TYPE 2 DIABETES MELLITUS WITHOUT COMPLICATION, WITHOUT LONG-TERM CURRENT USE OF INSULIN: Primary | ICD-10-CM

## 2025-04-02 DIAGNOSIS — E78.00 HIGH CHOLESTEROL: ICD-10-CM

## 2025-04-02 PROBLEM — K57.90 DIVERTICULOSIS: Status: ACTIVE | Noted: 2025-04-02

## 2025-04-02 PROBLEM — M25.461 EFFUSION OF RIGHT KNEE: Status: RESOLVED | Noted: 2020-10-25 | Resolved: 2025-04-02

## 2025-04-02 RX ORDER — LOSARTAN POTASSIUM 25 MG/1
25 TABLET ORAL DAILY
Qty: 90 TABLET | Refills: 1 | Status: SHIPPED | OUTPATIENT
Start: 2025-04-02

## 2025-04-02 RX ORDER — PRAVASTATIN SODIUM 10 MG
10 TABLET ORAL DAILY
Qty: 90 TABLET | Refills: 1 | Status: SHIPPED | OUTPATIENT
Start: 2025-04-02

## 2025-04-02 NOTE — ASSESSMENT & PLAN NOTE
Medical condition is stable.  Continue same therapy.  Will recheck at next regular appointment    Orders:    metFORMIN (GLUCOPHAGE) 500 MG tablet; Take 1 tablet by mouth 2 (Two) Times a Day.    pravastatin (PRAVACHOL) 10 MG tablet; Take 1 tablet by mouth Daily.    losartan (COZAAR) 25 MG tablet; Take 1 tablet by mouth Daily.    Comprehensive Metabolic Panel; Future    Lipid Panel; Future    Hemoglobin A1c; Future    Microalbumin / Creatinine Urine Ratio - Urine, Clean Catch; Future

## 2025-04-02 NOTE — PROGRESS NOTES
Chief Complaint  Nolan Browning presents to [facility] for Diabetes (Follow up)    Subjective          History of Present Illness    Haja is following up on type 2 diabetes.  Current meds include an oral hypoglycemic ( Glucophage) and a statin (Pravastatin) and an ARB (Losartan).  Most recent lab results include Hemoglobin A1c:  7.1 (%). UTD eye exam with Dr Hanson.   He had DVT LE 8/2021. Completed Xarelto course. Saw vascular and hematology and advised PRN follow up.   Previously saw ortho Dr Pinon for OA right knee. He had steroid injection. Taking meloxicam with relief. To see ortho PRN. Has reduced meloxicam to 1/2 tab due to kidney labs.     Review of Systems      Allergies   Allergen Reactions    Lisinopril Cough      Past Medical History:   Diagnosis Date    Diverticulosis of large intestine without perforation or abscess without bleeding     DVT (deep venous thrombosis)     Gout, unspecified     High cholesterol     Personal history of colonic polyps     Type 2 diabetes mellitus without complications      Current Outpatient Medications   Medication Sig Dispense Refill    glucose blood test strip Use as instructed 100 each 0    Lancets (freestyle) lancets Use as instructed 100 each 0    meloxicam (MOBIC) 7.5 MG tablet TAKE 1 TABLET BY MOUTH ONCE DAILY (Patient taking differently: Take 0.5 tablets by mouth Daily.) 90 tablet 0    Misc Natural Products (Osteo Bi-Flex Adv Triple St) tablet Take  by mouth Daily.      Turmeric 500 MG capsule Take  by mouth Daily.      losartan (COZAAR) 25 MG tablet Take 1 tablet by mouth Daily. 90 tablet 1    metFORMIN (GLUCOPHAGE) 500 MG tablet Take 1 tablet by mouth 2 (Two) Times a Day. 180 tablet 1    pravastatin (PRAVACHOL) 10 MG tablet Take 1 tablet by mouth Daily. 90 tablet 1     No current facility-administered medications for this visit.     Past Surgical History:   Procedure Laterality Date    KNEE CARTILAGE SURGERY Right 01/25/2005     "knee arthroscopy     WISDOM TOOTH EXTRACTION        Social History     Tobacco Use    Smoking status: Never     Passive exposure: Never    Smokeless tobacco: Never   Vaping Use    Vaping status: Never Used   Substance Use Topics    Alcohol use: Yes    Drug use: Never     Family History   Problem Relation Age of Onset    Heart disease Mother     Heart attack Mother         CAUSE OF DEATH    Dementia Father     Stroke Father     Diabetes Brother      Health Maintenance Due   Topic Date Due    HEMOGLOBIN A1C  03/25/2025      Immunization History   Administered Date(s) Administered    COVID-19 (MODERNA) 1st,2nd,3rd Dose Monovalent 03/04/2021, 04/10/2021, 11/22/2021    Influenza, Unspecified 02/14/2020    Tdap 08/19/2020        Objective     Vitals:    04/02/25 0750   BP: 124/86   Pulse: 76   Temp: 98.8 °F (37.1 °C)   TempSrc: Oral   SpO2: 95%   Weight: 120 kg (264 lb 12.8 oz)   Height: 185.4 cm (72.99\")     Body mass index is 34.94 kg/m².            No results found.    Physical Exam  Vitals reviewed.   Constitutional:       General: He is not in acute distress.     Appearance: Normal appearance. He is well-developed.   HENT:      Head: Normocephalic and atraumatic.   Cardiovascular:      Rate and Rhythm: Normal rate and regular rhythm.   Pulmonary:      Effort: Pulmonary effort is normal.      Breath sounds: Normal breath sounds.   Neurological:      Mental Status: He is alert and oriented to person, place, and time.   Psychiatric:         Mood and Affect: Mood and affect normal.           Result Review :                               Assessment and Plan      Assessment & Plan  Type 2 diabetes mellitus without complication, without long-term current use of insulin    Medical condition is stable.  Continue same therapy.  Will recheck at next regular appointment    Orders:    metFORMIN (GLUCOPHAGE) 500 MG tablet; Take 1 tablet by mouth 2 (Two) Times a Day.    pravastatin (PRAVACHOL) 10 MG tablet; Take 1 tablet " by mouth Daily.    losartan (COZAAR) 25 MG tablet; Take 1 tablet by mouth Daily.    Comprehensive Metabolic Panel; Future    Lipid Panel; Future    Hemoglobin A1c; Future    Microalbumin / Creatinine Urine Ratio - Urine, Clean Catch; Future    High cholesterol     Medical condition is stable.  Continue same therapy.  Will recheck at next regular appointment         Primary osteoarthritis of right knee  Medical condition is stable.  Continue same therapy.  Will recheck at next regular appointment                 Follow Up     Return in about 6 months (around 10/2/2025) for Annual physical.

## 2025-04-02 NOTE — ASSESSMENT & PLAN NOTE
Medical condition is stable.  Continue same therapy.  Will recheck at next regular appointment

## 2025-04-22 ENCOUNTER — LAB (OUTPATIENT)
Dept: LAB | Facility: HOSPITAL | Age: 65
End: 2025-04-22
Payer: COMMERCIAL

## 2025-04-22 DIAGNOSIS — E11.9 TYPE 2 DIABETES MELLITUS WITHOUT COMPLICATION, WITHOUT LONG-TERM CURRENT USE OF INSULIN: ICD-10-CM

## 2025-04-22 LAB
ALBUMIN SERPL-MCNC: 4 G/DL (ref 3.5–5.2)
ALBUMIN UR-MCNC: <1.2 MG/DL
ALBUMIN/GLOB SERPL: 1.3 G/DL
ALP SERPL-CCNC: 106 U/L (ref 39–117)
ALT SERPL W P-5'-P-CCNC: 18 U/L (ref 1–41)
ANION GAP SERPL CALCULATED.3IONS-SCNC: 10.7 MMOL/L (ref 5–15)
AST SERPL-CCNC: 22 U/L (ref 1–40)
BILIRUB SERPL-MCNC: 0.6 MG/DL (ref 0–1.2)
BUN SERPL-MCNC: 15 MG/DL (ref 8–23)
BUN/CREAT SERPL: 12.1 (ref 7–25)
CALCIUM SPEC-SCNC: 8.9 MG/DL (ref 8.6–10.5)
CHLORIDE SERPL-SCNC: 107 MMOL/L (ref 98–107)
CHOLEST SERPL-MCNC: 143 MG/DL (ref 0–200)
CO2 SERPL-SCNC: 24.3 MMOL/L (ref 22–29)
CREAT SERPL-MCNC: 1.24 MG/DL (ref 0.76–1.27)
CREAT UR-MCNC: 166.9 MG/DL
EGFRCR SERPLBLD CKD-EPI 2021: 64.9 ML/MIN/1.73
GLOBULIN UR ELPH-MCNC: 3 GM/DL
GLUCOSE SERPL-MCNC: 129 MG/DL (ref 65–99)
HBA1C MFR BLD: 7.4 % (ref 4.8–5.6)
HDLC SERPL-MCNC: 37 MG/DL (ref 40–60)
LDLC SERPL CALC-MCNC: 81 MG/DL (ref 0–100)
LDLC/HDLC SERPL: 2.09 {RATIO}
MICROALBUMIN/CREAT UR: NORMAL MG/G{CREAT}
POTASSIUM SERPL-SCNC: 4.3 MMOL/L (ref 3.5–5.2)
PROT SERPL-MCNC: 7 G/DL (ref 6–8.5)
SODIUM SERPL-SCNC: 142 MMOL/L (ref 136–145)
TRIGL SERPL-MCNC: 143 MG/DL (ref 0–150)
VLDLC SERPL-MCNC: 25 MG/DL (ref 5–40)

## 2025-04-22 PROCEDURE — 80061 LIPID PANEL: CPT

## 2025-04-22 PROCEDURE — 83036 HEMOGLOBIN GLYCOSYLATED A1C: CPT

## 2025-04-22 PROCEDURE — 82043 UR ALBUMIN QUANTITATIVE: CPT

## 2025-04-22 PROCEDURE — 82570 ASSAY OF URINE CREATININE: CPT

## 2025-04-22 PROCEDURE — 36415 COLL VENOUS BLD VENIPUNCTURE: CPT

## 2025-04-22 PROCEDURE — 80053 COMPREHEN METABOLIC PANEL: CPT

## 2025-04-23 ENCOUNTER — RESULTS FOLLOW-UP (OUTPATIENT)
Dept: FAMILY MEDICINE CLINIC | Age: 65
End: 2025-04-23
Payer: COMMERCIAL